# Patient Record
Sex: MALE | Race: BLACK OR AFRICAN AMERICAN | Employment: FULL TIME | URBAN - METROPOLITAN AREA
[De-identification: names, ages, dates, MRNs, and addresses within clinical notes are randomized per-mention and may not be internally consistent; named-entity substitution may affect disease eponyms.]

---

## 2021-12-16 ENCOUNTER — OFFICE VISIT (OUTPATIENT)
Dept: INTERNAL MEDICINE CLINIC | Age: 46
End: 2021-12-16
Payer: COMMERCIAL

## 2021-12-16 VITALS
BODY MASS INDEX: 27.85 KG/M2 | WEIGHT: 217 LBS | HEART RATE: 86 BPM | SYSTOLIC BLOOD PRESSURE: 148 MMHG | TEMPERATURE: 98.1 F | RESPIRATION RATE: 16 BRPM | HEIGHT: 74 IN | OXYGEN SATURATION: 97 % | DIASTOLIC BLOOD PRESSURE: 80 MMHG

## 2021-12-16 DIAGNOSIS — I10 ESSENTIAL HYPERTENSION: Primary | ICD-10-CM

## 2021-12-16 DIAGNOSIS — Z13.1 SCREENING FOR DIABETES MELLITUS: ICD-10-CM

## 2021-12-16 DIAGNOSIS — G60.9 IDIOPATHIC PERIPHERAL NEUROPATHY: ICD-10-CM

## 2021-12-16 DIAGNOSIS — Z00.00 HEALTHCARE MAINTENANCE: ICD-10-CM

## 2021-12-16 DIAGNOSIS — Z78.9 ALCOHOL USE: ICD-10-CM

## 2021-12-16 DIAGNOSIS — Z72.0 TOBACCO ABUSE: ICD-10-CM

## 2021-12-16 DIAGNOSIS — F41.1 GAD (GENERALIZED ANXIETY DISORDER): ICD-10-CM

## 2021-12-16 DIAGNOSIS — F32.A DEPRESSION, UNSPECIFIED DEPRESSION TYPE: ICD-10-CM

## 2021-12-16 DIAGNOSIS — K40.90 INGUINAL HERNIA, RIGHT: ICD-10-CM

## 2021-12-16 LAB
ANION GAP SERPL CALC-SCNC: 5 MMOL/L (ref 5–15)
BUN SERPL-MCNC: 20 MG/DL (ref 6–20)
BUN/CREAT SERPL: 21 (ref 12–20)
CALCIUM SERPL-MCNC: 9.9 MG/DL (ref 8.5–10.1)
CHLORIDE SERPL-SCNC: 106 MMOL/L (ref 97–108)
CHOLEST SERPL-MCNC: 352 MG/DL
CO2 SERPL-SCNC: 27 MMOL/L (ref 21–32)
CREAT SERPL-MCNC: 0.94 MG/DL (ref 0.7–1.3)
EST. AVERAGE GLUCOSE BLD GHB EST-MCNC: 117 MG/DL
GLUCOSE SERPL-MCNC: 121 MG/DL (ref 65–100)
HBA1C MFR BLD: 5.7 % (ref 4–5.6)
HDLC SERPL-MCNC: 48 MG/DL
HDLC SERPL: 7.3 {RATIO} (ref 0–5)
HIV 1+2 AB+HIV1 P24 AG SERPL QL IA: NONREACTIVE
HIV12 RESULT COMMENT, HHIVC: NORMAL
LDLC SERPL CALC-MCNC: 238.6 MG/DL (ref 0–100)
POTASSIUM SERPL-SCNC: 4.2 MMOL/L (ref 3.5–5.1)
SODIUM SERPL-SCNC: 138 MMOL/L (ref 136–145)
TRIGL SERPL-MCNC: 327 MG/DL (ref ?–150)
VLDLC SERPL CALC-MCNC: 65.4 MG/DL

## 2021-12-16 PROCEDURE — 90472 IMMUNIZATION ADMIN EACH ADD: CPT | Performed by: INTERNAL MEDICINE

## 2021-12-16 PROCEDURE — 90471 IMMUNIZATION ADMIN: CPT | Performed by: INTERNAL MEDICINE

## 2021-12-16 PROCEDURE — 90686 IIV4 VACC NO PRSV 0.5 ML IM: CPT | Performed by: INTERNAL MEDICINE

## 2021-12-16 PROCEDURE — 90715 TDAP VACCINE 7 YRS/> IM: CPT | Performed by: INTERNAL MEDICINE

## 2021-12-16 PROCEDURE — 99204 OFFICE O/P NEW MOD 45 MIN: CPT | Performed by: INTERNAL MEDICINE

## 2021-12-16 RX ORDER — HYDROCHLOROTHIAZIDE 25 MG/1
25 TABLET ORAL DAILY
Qty: 30 TABLET | Refills: 2 | Status: SHIPPED | OUTPATIENT
Start: 2021-12-16 | End: 2022-06-20 | Stop reason: SDUPTHER

## 2021-12-16 NOTE — PATIENT INSTRUCTIONS
DASH Diet: Care Instructions  Your Care Instructions     The DASH diet is an eating plan that can help lower your blood pressure. DASH stands for Dietary Approaches to Stop Hypertension. Hypertension is high blood pressure. The DASH diet focuses on eating foods that are high in calcium, potassium, and magnesium. These nutrients can lower blood pressure. The foods that are highest in these nutrients are fruits, vegetables, low-fat dairy products, nuts, seeds, and legumes. But taking calcium, potassium, and magnesium supplements instead of eating foods that are high in those nutrients does not have the same effect. The DASH diet also includes whole grains, fish, and poultry. The DASH diet is one of several lifestyle changes your doctor may recommend to lower your high blood pressure. Your doctor may also want you to decrease the amount of sodium in your diet. Lowering sodium while following the DASH diet can lower blood pressure even further than just the DASH diet alone. Follow-up care is a key part of your treatment and safety. Be sure to make and go to all appointments, and call your doctor if you are having problems. It's also a good idea to know your test results and keep a list of the medicines you take. How can you care for yourself at home? Following the DASH diet  · Eat 4 to 5 servings of fruit each day. A serving is 1 medium-sized piece of fruit, ½ cup chopped or canned fruit, 1/4 cup dried fruit, or 4 ounces (½ cup) of fruit juice. Choose fruit more often than fruit juice. · Eat 4 to 5 servings of vegetables each day. A serving is 1 cup of lettuce or raw leafy vegetables, ½ cup of chopped or cooked vegetables, or 4 ounces (½ cup) of vegetable juice. Choose vegetables more often than vegetable juice. · Get 2 to 3 servings of low-fat and fat-free dairy each day. A serving is 8 ounces of milk, 1 cup of yogurt, or 1 ½ ounces of cheese. · Eat 6 to 8 servings of grains each day.  A serving is 1 slice of bread, 1 ounce of dry cereal, or ½ cup of cooked rice, pasta, or cooked cereal. Try to choose whole-grain products as much as possible. · Limit lean meat, poultry, and fish to 2 servings each day. A serving is 3 ounces, about the size of a deck of cards. · Eat 4 to 5 servings of nuts, seeds, and legumes (cooked dried beans, lentils, and split peas) each week. A serving is 1/3 cup of nuts, 2 tablespoons of seeds, or ½ cup of cooked beans or peas. · Limit fats and oils to 2 to 3 servings each day. A serving is 1 teaspoon of vegetable oil or 2 tablespoons of salad dressing. · Limit sweets and added sugars to 5 servings or less a week. A serving is 1 tablespoon jelly or jam, ½ cup sorbet, or 1 cup of lemonade. · Eat less than 2,300 milligrams (mg) of sodium a day. If you limit your sodium to 1,500 mg a day, you can lower your blood pressure even more. · Be aware that all of these are the suggested number of servings for people who eat 1,800 to 2,000 calories a day. Your recommended number of servings may be different if you need more or fewer calories. Tips for success  · Start small. Do not try to make dramatic changes to your diet all at once. You might feel that you are missing out on your favorite foods and then be more likely to not follow the plan. Make small changes, and stick with them. Once those changes become habit, add a few more changes. · Try some of the following:  ? Make it a goal to eat a fruit or vegetable at every meal and at snacks. This will make it easy to get the recommended amount of fruits and vegetables each day. ? Try yogurt topped with fruit and nuts for a snack or healthy dessert. ? Add lettuce, tomato, cucumber, and onion to sandwiches. ? Combine a ready-made pizza crust with low-fat mozzarella cheese and lots of vegetable toppings. Try using tomatoes, squash, spinach, broccoli, carrots, cauliflower, and onions. ?  Have a variety of cut-up vegetables with a low-fat dip as an appetizer instead of chips and dip. ? Sprinkle sunflower seeds or chopped almonds over salads. Or try adding chopped walnuts or almonds to cooked vegetables. ? Try some vegetarian meals using beans and peas. Add garbanzo or kidney beans to salads. Make burritos and tacos with mashed julio beans or black beans. Where can you learn more? Go to http://www.sheets.com/  Enter H967 in the search box to learn more about \"DASH Diet: Care Instructions. \"  Current as of: April 29, 2021               Content Version: 13.0  © 2776-5329 [employer]. Care instructions adapted under license by [employer] (which disclaims liability or warranty for this information). If you have questions about a medical condition or this instruction, always ask your healthcare professional. [address] any warranty or liability for your use of this information.

## 2021-12-16 NOTE — PROGRESS NOTES
Chief Complaint   Patient presents with   [facility]    Foot Problem     pain 6/10; chronic; pt describes as \" bone pain;     1. Have you been to the ER, urgent care clinic since your last visit? Hospitalized since your last visit? No    2. Have you seen or consulted any other health care providers outside of the [address] [place] [address] since your last visit? Include any pap smears or colon screening. Karissa Ventura is a 42 y.o. male  who presents for routine immunizations. He denies any symptoms , reactions or allergies that would exclude them from being immunized today. Risks and adverse reactions were discussed and the VIS was given to them. All questions were addressed. He was observed for 5 min post injection. There were no reactions observed.     Deepak Castañeda

## 2021-12-16 NOTE — PROGRESS NOTES
Office Visit Note:    Assessment/Plan:  1. Essential hypertension    2. Idiopathic peripheral neuropathy    3. Inguinal hernia, right    4. Screening for diabetes mellitus    5. Tobacco abuse    6. Depression, unspecified depression type    7. JOSE G (generalized anxiety disorder)    8. Alcohol use    9. Healthcare maintenance      1. Hypertension-he has newly diagnosed hypertension, blood pressure significantly elevated at 148/80. Will need to be started on antihypertensives, will start with hydrochlorothiazide at 25 mg. Advised to go on low-salt diet, handout for DASH diet given. Since he is hypertensive I also advised him to stop smoking because that would increase his cardiac risk. We will get a lipid panel to calculate his cardiac risk and if elevated may need to be on a statin. We will follow-up with him in 3 to 4 weeks. Advised to get a blood pressure monitor and bring a blood pressure log with ambulatory blood pressure readings. 2. Peripheral neuropathy-he has evidence of peripheral neuropathy with there are no clear etiology. Will screen for diabetes, he also drinks alcohol daily, advised to cut down on alcohol. I will refer to neurology for further evaluation  3. Right inguinal hernia-we will refer to general surgery  4. Screening for diabetes-he does complain of some polyuria and polydipsia, also had an abscess in his foot. Will do an A1c to screen for diabetes  5. Tobacco use-he did smoke daily, advised to stop smoking. He will tell me when he is ready to quit smoking he may benefit from being on Wellbutrin  6. Depression and anxiety-his JOSE G-7 score was at 15 and his PHQ-9 score was at 12. Discussed starting him on medications which he was not too keen on. Not suicidal at this time, will discuss again next visit  7.  Alcohol use-he tells me that he drinks 2 beers every day, drinking 14 drinks a week, I told him that he is at a high risk for alcohol dependence and asked him to cut down his alcohol use    Health Maintenance:  Immunizations:  Tetanus: given 12/16/21  Influenza:given 12/16/21  Covid:  2 doses of pfizer    Screening:  Colon cancer: start at age of 39  Hepatitis C: Ordered today  HIV: Ordered today  Diabetes: A1c ordered today      Orders Placed This Encounter    INFLUENZA VIRUS VACCINE QUADRIVALENT, PRESERVATIVE FREE SYRINGE (50010)    TETANUS, DIPHTHERIA TOXOIDS AND ACELLULAR PERTUSSIS VACCINE (TDAP), IN INDIVIDS. >=7, IM    METABOLIC PANEL, BASIC     Standing Status:   Future     Standing Expiration Date:   12/16/2022    HCV AB W/RFLX TO WOJCIECH     Standing Status:   Future     Standing Expiration Date:   12/16/2022    HEMOGLOBIN A1C WITH EAG     Standing Status:   Future     Standing Expiration Date:   12/16/2022    HIV 1/2 AG/AB, 4TH GENERATION,W RFLX CONFIRM     Standing Status:   Future     Standing Expiration Date:   12/16/2022    LIPID PANEL     Standing Status:   Future     Standing Expiration Date:   12/16/2022    REFERRAL TO NEUROLOGY     Referral Priority:   Routine     Referral Type:   Consultation     Referral Reason:   Specialty Services Required     Referred to Provider:   Daryle Mires, MD     Requested Specialty:   Neurology     Number of Visits Requested:   1    REFERRAL TO GENERAL SURGERY     Referral Priority:   Routine     Referral Type:   Consultation     Referral Reason:   Specialty Services Required     Referred to Provider:   Priscilla Mcdowell MD     Requested Specialty:   General Surgery     Number of Visits Requested:   1    hydroCHLOROthiazide (HYDRODIURIL) 25 mg tablet     Sig: Take 1 Tablet by mouth daily.      Dispense:  30 Tablet     Refill:  2     Social Determinants of Health     Tobacco Use: High Risk    Smoking Tobacco Use: Current Every Day Smoker    Smokeless Tobacco Use: Never Used   Alcohol Use:     Frequency of Alcohol Consumption: Not on file    Average Number of Drinks: Not on file    Frequency of Binge Drinking: Not on file Financial Resource Strain:     Difficulty of Paying Living Expenses: Not on file   Food Insecurity:     Worried About Running Out of Food in the Last Year: Not on file    Leon of Food in the Last Year: Not on file   Transportation Needs:     Lack of Transportation (Medical): Not on file    Lack of Transportation (Non-Medical): Not on file   Physical Activity:     Days of Exercise per Week: Not on file    Minutes of Exercise per Session: Not on file   Stress:     Feeling of Stress : Not on file   Social Connections:     Frequency of Communication with Friends and Family: Not on file    Frequency of Social Gatherings with Friends and Family: Not on file    Attends Islam Services: Not on file    Active Member of [employer] or Organizations: Not on file    Attends Club or Organization Meetings: Not on file    Marital Status: Not on file   Intimate Partner Violence:     Fear of Current or Ex-Partner: Not on file    Emotionally Abused: Not on file    Physically Abused: Not on file    Sexually Abused: Not on file   Depression:     PHQ-2 Score: Not on file   Housing Stability:     Unable to Pay for Housing in the Last Year: Not on file    Number of [place] in the Last Year: Not on file    Unstable Housing in the Last Year: Not on file       Follow-up and Dispositions    · Return in about 1 month (around 1/16/2022). I have reviewed with the patient details of the assessment and plan and all questions were answered. Relevant patient education was performed. The most recent lab findings were reviewed with the patient. An After Visit Summary was printed and given to the patient. Reason for Visit: Establish Care and Foot Problem (pain 6/10; chronic; pt describes as \" bone pain;)      Subjective:  43-year-old male with no significant past medical history comes to establish with me as a primary care physician. He does complain of some numbness in his bilateral feet more in his left foot. He states that it started insidiously and has progressed over the last couple of years. He apparently developed an abscess in his left cough which resolved spontaneously. He did not see a doctor for it. He has also noticed a right inguinal hernia when he coughs. Denies any pain at this time. He denies any other complaints to me now. He denies fevers, chills, chest pain, shortness of breath, abdominal pain, nausea, vomiting, diarrhea constipation. Review of Systems  A complete 11 system ROS was preformed (constitutional, eyes, ENT, cardiovascular, respiratory, gastrointestinal, genitourinary, musculoskeletal, skin, neurological, psychiatric) and was negative aside from the pertinent positives and negatives noted in the HPI. History reviewed. No pertinent past medical history. History reviewed. No pertinent surgical history. Social History     Socioeconomic History    Marital status: UNKNOWN   Tobacco Use    Smoking status: Current Every Day Smoker    Smokeless tobacco: Never Used   Substance and Sexual Activity    Alcohol use: Yes     Alcohol/week: 15.0 standard drinks     Types: 15 Cans of beer per week     Comment: pt reports about 2 beer per day    Drug use: Not Currently    Sexual activity: Yes     Partners: Female     Family History   Problem Relation Age of Onset    Hypertension Mother     Heart Disease Father     Diabetes Brother        No Known Allergies    Objective:  Visit Vitals  BP (!) 148/80 (BP 1 Location: Left upper arm, BP Patient Position: Semi fowlers, BP Cuff Size: Adult)   Pulse 86   Temp 98.1 °F (36.7 °C) (Oral)   Resp 16   Ht 6' 2\" (1.88 m)   Wt 217 lb (98.4 kg)   SpO2 97%   BMI 27.86 kg/m²     Physical Exam:   AA&O x3. not in any distress. HEENT: ENT negative. Lungs: clear  Heart: S1 S2 + flow murmur present  Abdomen: Soft, No tenderness  Neuro: No focal deficits.   Skin: No fluctuance or erythema noted in the lower extremities  Extremities: no pedal edema, R ing hernia  Psych: Normal affect and mood. No results found for this or any previous visit. Aria Stone MD, [address].   [address] [address], [place], [place].

## 2021-12-16 NOTE — LETTER
12/16/2021 9:33 AM    Mr. Regan Palacios  [address] [facility] [address]      Re: Regan Palacios  1975     Mr Regan Palacios was seen today, December 15, 2021  in this clinic by Dr Taisha Arreguin to establish care.           Sincerely,      Taisha Arreguin MD

## 2021-12-17 LAB
HCV AB S/CO SERPL IA: <0.1 S/CO RATIO (ref 0–0.9)
HCV AB SERPL QL IA: NORMAL

## 2021-12-29 RX ORDER — ATORVASTATIN CALCIUM 40 MG/1
40 TABLET, FILM COATED ORAL DAILY
Qty: 30 TABLET | Refills: 2 | Status: SHIPPED | OUTPATIENT
Start: 2021-12-29 | End: 2022-01-18 | Stop reason: SDUPTHER

## 2021-12-29 NOTE — PROGRESS NOTES
High LDL noted, just prediabetic, Will order high intensity atorvostatin. I called the patient and discussed the results with him.  Will fu in 6-8 weeks for repeat lipid panel

## 2022-01-18 ENCOUNTER — OFFICE VISIT (OUTPATIENT)
Dept: INTERNAL MEDICINE CLINIC | Age: 47
End: 2022-01-18
Payer: COMMERCIAL

## 2022-01-18 VITALS
OXYGEN SATURATION: 100 % | RESPIRATION RATE: 16 BRPM | HEIGHT: 74 IN | BODY MASS INDEX: 27.85 KG/M2 | HEART RATE: 76 BPM | TEMPERATURE: 98.1 F | SYSTOLIC BLOOD PRESSURE: 146 MMHG | DIASTOLIC BLOOD PRESSURE: 91 MMHG | WEIGHT: 217 LBS

## 2022-01-18 DIAGNOSIS — Z72.0 TOBACCO ABUSE: ICD-10-CM

## 2022-01-18 DIAGNOSIS — E78.2 MIXED HYPERLIPIDEMIA: ICD-10-CM

## 2022-01-18 DIAGNOSIS — F32.A DEPRESSION, UNSPECIFIED DEPRESSION TYPE: ICD-10-CM

## 2022-01-18 DIAGNOSIS — G60.9 IDIOPATHIC PERIPHERAL NEUROPATHY: ICD-10-CM

## 2022-01-18 DIAGNOSIS — Z00.00 HEALTHCARE MAINTENANCE: ICD-10-CM

## 2022-01-18 DIAGNOSIS — F41.1 GAD (GENERALIZED ANXIETY DISORDER): ICD-10-CM

## 2022-01-18 DIAGNOSIS — I10 ESSENTIAL HYPERTENSION: Primary | ICD-10-CM

## 2022-01-18 DIAGNOSIS — R73.03 PREDIABETES: ICD-10-CM

## 2022-01-18 PROCEDURE — 99214 OFFICE O/P EST MOD 30 MIN: CPT | Performed by: INTERNAL MEDICINE

## 2022-01-18 RX ORDER — AMLODIPINE BESYLATE 10 MG/1
10 TABLET ORAL DAILY
Qty: 30 TABLET | Refills: 2 | Status: SHIPPED | OUTPATIENT
Start: 2022-01-18 | End: 2022-03-15

## 2022-01-18 RX ORDER — ATORVASTATIN CALCIUM 40 MG/1
80 TABLET, FILM COATED ORAL DAILY
Qty: 30 TABLET | Refills: 2 | Status: SHIPPED | OUTPATIENT
Start: 2022-01-18 | End: 2022-01-20

## 2022-01-18 NOTE — PROGRESS NOTES
Office Visit Note:    Assessment/Plan:  1. Essential hypertension    2. Prediabetes    3. Mixed hyperlipidemia    4. JOSE G (generalized anxiety disorder)    5. Depression, unspecified depression type    6. Tobacco abuse    7. Idiopathic peripheral neuropathy    8. Healthcare maintenance      1. Hypertension-his blood pressure is still elevated at 146/91. He is already on hydrochlorothiazide 25 mg. I will add amlodipine 10 mg. I will also advised him to check his blood pressure at home and bring a log, this could also be whitecoat hypertension. Also advised to stay on the diet. 2. Hyperlipidemia-his lipid panel showed LDL of 238, triglyceride of 327 and a total cholesterol of 352. I had called in Lipitor 40 mg which she is tolerating well, will increase it to 80 mg of Lipitor, will plan to check lipid panel in his next visit in 2 months. 3. Peripheral neuropathy-referred to neurology last visit, improving  4. Right inguinal hernia-seeing general surgery next week  5. Pre diabetes-advised weight loss  6. Tobacco use-he did smoke daily, advised to stop smoking. He will tell me when he is ready to quit smoking he may benefit from being on Wellbutrin  7. Depression and anxiety-improving  8. Alcohol use-he tells me that he drinks 2 beers every day, drinking 14 drinks a week, I told him that he is at a high risk for alcohol dependence and asked him to cut down his alcohol use    Health Maintenance:  Immunizations:  Tetanus: given 12/16/21  Influenza:given 12/16/21  Covid:  2 doses of pfizer    Screening:  Colon cancer: start at age of 39  Hepatitis C: Ordered today  HIV: Ordered today  Diabetes: A1c ordered today      Orders Placed This Encounter    amLODIPine (NORVASC) 10 mg tablet     Sig: Take 1 Tablet by mouth daily. Dispense:  30 Tablet     Refill:  2    atorvastatin (LIPITOR) 40 mg tablet     Sig: Take 2 Tablets by mouth daily.      Dispense:  30 Tablet     Refill:  2     Social Determinants of Health Tobacco Use: High Risk    Smoking Tobacco Use: Current Every Day Smoker    Smokeless Tobacco Use: Never Used   Alcohol Use:     Frequency of Alcohol Consumption: Not on file    Average Number of Drinks: Not on file    Frequency of Binge Drinking: Not on file   Financial Resource Strain:     Difficulty of Paying Living Expenses: Not on file   Food Insecurity:     Worried About Running Out of Food in the Last Year: Not on file    Leon of Food in the Last Year: Not on file   Transportation Needs:     Lack of Transportation (Medical): Not on file    Lack of Transportation (Non-Medical): Not on file   Physical Activity:     Days of Exercise per Week: Not on file    Minutes of Exercise per Session: Not on file   Stress:     Feeling of Stress : Not on file   Social Connections:     Frequency of Communication with Friends and Family: Not on file    Frequency of Social Gatherings with Friends and Family: Not on file    Attends Yazidism Services: Not on file    Active Member of [address] [place] [address] or Organizations: Not on file    Attends Club or Organization Meetings: Not on file    Marital Status: Not on file   Intimate Partner Violence:     Fear of Current or Ex-Partner: Not on file    Emotionally Abused: Not on file    Physically Abused: Not on file    Sexually Abused: Not on file   Depression:     PHQ-2 Score: Not on file   Housing Stability:     Unable to Pay for Housing in the Last Year: Not on file    Number of [place] in the Last Year: Not on file    Unstable Housing in the Last Year: Not on file       Follow-up and Dispositions    · Return in about 2 months (around 3/18/2022). I have reviewed with the patient details of the assessment and plan and all questions were answered. Relevant patient education was performed. The most recent lab findings were reviewed with the patient. An After Visit Summary was printed and given to the patient.     Reason for Visit: Follow-up      Subjective:  44-year-old male who was recently diagnosed with hypertension comes for follow-up. He states that he does not check his blood pressure regularly at home. He tries to follow his diet. He also had some episodes of peripheral neuropathy which is improving. No other complaints. Review of Systems  A complete 11 system ROS was preformed (constitutional, eyes, ENT, cardiovascular, respiratory, gastrointestinal, genitourinary, musculoskeletal, skin, neurological, psychiatric) and was negative aside from the pertinent positives and negatives noted in the HPI. History reviewed. No pertinent past medical history. History reviewed. No pertinent surgical history. Social History     Socioeconomic History    Marital status:    Tobacco Use    Smoking status: Current Every Day Smoker    Smokeless tobacco: Never Used   Substance and Sexual Activity    Alcohol use: Yes     Alcohol/week: 15.0 standard drinks     Types: 15 Cans of beer per week     Comment: pt reports about 2 beer per day    Drug use: Not Currently    Sexual activity: Yes     Partners: Female     Family History   Problem Relation Age of Onset    Hypertension Mother     Heart Disease Father     Diabetes Brother        No Known Allergies    Objective:  Visit Vitals  BP (!) 146/91 (BP 1 Location: Right arm, BP Patient Position: Sitting, BP Cuff Size: Large adult)   Pulse 76   Temp 98.1 °F (36.7 °C) (Oral)   Resp 16   Ht 6' 2\" (1.88 m)   Wt 217 lb (98.4 kg)   SpO2 100%   BMI 27.86 kg/m²     Physical Exam:   AA&O x3. not in any distress. HEENT: ENT negative. Lungs: clear  Heart: S1 S2 + flow murmur present  Psych: Normal affect and mood.   Results for orders placed or performed in visit on 12/16/21   LIPID PANEL   Result Value Ref Range    Cholesterol, total 352 (H) <200 MG/DL    Triglyceride 327 (H) <150 MG/DL    HDL Cholesterol 48 MG/DL    LDL, calculated 238.6 (H) 0 - 100 MG/DL    VLDL, calculated 65.4 MG/DL CHOL/HDL Ratio 7.3 (H) 0.0 - 5.0     METABOLIC PANEL, BASIC   Result Value Ref Range    Sodium 138 136 - 145 mmol/L    Potassium 4.2 3.5 - 5.1 mmol/L    Chloride 106 97 - 108 mmol/L    CO2 27 21 - 32 mmol/L    Anion gap 5 5 - 15 mmol/L    Glucose 121 (H) 65 - 100 mg/dL    BUN 20 6 - 20 MG/DL    Creatinine 0.94 0.70 - 1.30 MG/DL    BUN/Creatinine ratio 21 (H) 12 - 20      GFR est AA >60 >60 ml/min/1.73m2    GFR est non-AA >60 >60 ml/min/1.73m2    Calcium 9.9 8.5 - 10.1 MG/DL   HCV AB W/RFLX TO WOJCIECH   Result Value Ref Range    HCV Ab <0.1 0.0 - 0.9 s/co ratio   HEMOGLOBIN A1C WITH EAG   Result Value Ref Range    Hemoglobin A1c 5.7 (H) 4.0 - 5.6 %    Est. average glucose 117 mg/dL   HIV 1/2 AG/AB, 4TH GENERATION,W RFLX CONFIRM   Result Value Ref Range    HIV 1/2 Interpretation NONREACTIVE NONREACTIVE      HIV 1/2 result comment SEE NOTE     HCV INTERPRETATION   Result Value Ref Range    HCV Interpretation Comment           Catherine Elizabeth MD, FACP, [facility].   [address] [facility].

## 2022-01-18 NOTE — PROGRESS NOTES
Identified pt with two pt identifiers(name and ). Reviewed record in preparation for visit and have obtained necessary documentation. Chief Complaint   Patient presents with    Follow-up        Health Maintenance Due   Topic    Pneumococcal 0-64 years (1 of 2 - PPSV23)    COVID-19 Vaccine (3 - Booster for Major Peter series)        Visit Vitals  BP (!) 146/91 (BP 1 Location: Right arm, BP Patient Position: Sitting, BP Cuff Size: Large adult)   Pulse 76   Temp 98.1 °F (36.7 °C) (Oral)   Resp 16   Ht 6' 2\" (1.88 m)   Wt 217 lb (98.4 kg)   SpO2 100%   BMI 27.86 kg/m²     Pain Scale: 0 - No pain/10    Coordination of Care Questionnaire:  :   1. Have you been to the ER, urgent care clinic since your last visit? Hospitalized since your last visit? No    2. Have you seen or consulted any other health care providers outside of the [address] [place] [address] since your last visit? Include any pap smears or colon screening.  No

## 2022-01-18 NOTE — LETTER
1/18/2022 9:33 AM    Mr. Bernabe Irizarry  [address]. [place] [address]      Re: Bernabe Irizarry  1975     Mr Bernabe Irizarry was seen today, January 18th, 2022  in this clinic by Dr Leena Ford to establish care.           Sincerely,      Leena Ford MD

## 2022-01-18 NOTE — PATIENT INSTRUCTIONS
High Cholesterol: Care Instructions  Overview     Cholesterol is a type of fat in your blood. It is needed for many body functions, such as making new cells. Cholesterol is made by your body. It also comes from food you eat. High cholesterol means that you have too much of the fat in your blood. This raises your risk of a heart attack and stroke. LDL and HDL are part of your total cholesterol. LDL is the \"bad\" cholesterol. High LDL can raise your risk for coronary artery disease, heart attack, and stroke. HDL is the \"good\" cholesterol. It helps clear bad cholesterol from the body. High HDL is linked with a lower risk of coronary artery disease, heart attack, and stroke. Your cholesterol levels help your doctor find out your risk for having a heart attack or stroke. You and your doctor can talk about whether you need to lower your risk and what treatment is best for you. Treatment options include a heart-healthy lifestyle and medicine. Both options can help lower your cholesterol and your risk. The way you choose to lower your risk will depend on how high your risk is for heart attack and stroke. It will also depend on how you feel about taking medicines. Follow-up care is a key part of your treatment and safety. Be sure to make and go to all appointments, and call your doctor if you are having problems. It's also a good idea to know your test results and keep a list of the medicines you take. How can you care for yourself at home? · Eat heart-healthy foods. ? Eat fruits, vegetables, whole grains, beans, and other high-fiber foods. ? Eat lean proteins, such as seafood, lean meats, beans, nuts, and soy products. ? Eat healthy fats, such as canola and olive oil. ? Choose foods that are low in saturated fat. ? Limit sodium and alcohol. ? Limit drinks and foods with added sugar. · Be physically active. Try to do moderate activity at least 2½ hours a week.  Or try to do vigorous activity at least 1¼ hours a week. You may want to walk or try other activities, such as running, swimming, cycling, or playing tennis or team sports. · Stay at a healthy weight or lose weight by making the changes in eating and physical activity listed above. Losing just a small amount of weight, even 5 to 10 pounds, can help reduce your risk for having a heart attack or stroke. · Do not smoke. · Manage other health problems. These include diabetes and high blood pressure. If you think you may have a problem with alcohol or drug use, talk to your doctor. · If you take medicine, take it exactly as prescribed. Call your doctor if you think you are having a problem with your medicine. · Check with your doctor or pharmacist before you use any other medicines, including over-the-counter medicines. Make sure your doctor knows all of the medicines, vitamins, herbal products, and supplements you take. Taking some medicines together can cause problems. When should you call for help? Watch closely for changes in your health, and be sure to contact your doctor if:    · You need help making lifestyle changes.     · You have questions about your medicine. Where can you learn more? Go to http://www.gray.com/  Enter G338 in the search box to learn more about \"High Cholesterol: Care Instructions. \"  Current as of: April 29, 2021               Content Version: 13.0  © 2006-2021 Healthwise[employer]. Care instructions adapted under license by [employer] (which disclaims liability or warranty for this information). If you have questions about a medical condition or this instruction, always ask your healthcare professional. [place] [address] any warranty or liability for your use of this information.

## 2022-01-20 ENCOUNTER — TELEPHONE (OUTPATIENT)
Dept: INTERNAL MEDICINE CLINIC | Age: 47
End: 2022-01-20

## 2022-01-20 RX ORDER — ATORVASTATIN CALCIUM 80 MG/1
80 TABLET, FILM COATED ORAL DAILY
Qty: 30 TABLET | Refills: 2 | Status: SHIPPED | OUTPATIENT
Start: 2022-01-20 | End: 2022-06-20 | Stop reason: SDUPTHER

## 2022-01-20 RX ORDER — ATORVASTATIN CALCIUM 40 MG/1
80 TABLET, FILM COATED ORAL DAILY
Qty: 30 TABLET | Refills: 2 | Status: CANCELLED | OUTPATIENT
Start: 2022-01-20

## 2022-01-20 NOTE — TELEPHONE ENCOUNTER
[facility] would like to clarify th directions and the quantity for the Lipitor 40 mg? Was prescribed for 2 tabs daily with a qnty of 30. Please review. Thank you. Requested Prescriptions     Pending Prescriptions Disp Refills    atorvastatin (LIPITOR) 40 mg tablet 30 Tablet 2     Sig: Take 2 Tablets by mouth daily.

## 2022-01-25 ENCOUNTER — OFFICE VISIT (OUTPATIENT)
Dept: SURGERY | Age: 47
End: 2022-01-25
Payer: COMMERCIAL

## 2022-01-25 VITALS
DIASTOLIC BLOOD PRESSURE: 60 MMHG | SYSTOLIC BLOOD PRESSURE: 100 MMHG | BODY MASS INDEX: 27.14 KG/M2 | HEIGHT: 74 IN | TEMPERATURE: 97.5 F | OXYGEN SATURATION: 96 % | RESPIRATION RATE: 16 BRPM | HEART RATE: 95 BPM | WEIGHT: 211.5 LBS

## 2022-01-25 DIAGNOSIS — K40.20 NON-RECURRENT BILATERAL INGUINAL HERNIA WITHOUT OBSTRUCTION OR GANGRENE: Primary | ICD-10-CM

## 2022-01-25 PROCEDURE — 99204 OFFICE O/P NEW MOD 45 MIN: CPT | Performed by: SURGERY

## 2022-01-25 NOTE — LETTER
1/25/2022    Patient: Sandy Rawls   YOB: 1975   Date of Visit: 1/25/2022     Natasha Oliver MD  [address] [place] [address]  Via In Basket    Dear Natasha Oliver MD,      Thank you for referring Mr. Sandy Rawls to [address] for evaluation. My notes for this consultation are attached. If you have questions, please do not hesitate to call me. I look forward to following your patient along with you.       Sincerely,    Anthony Cintron MD

## 2022-01-25 NOTE — PROGRESS NOTES
Identified pt with two pt identifiers(name and ). Reviewed record in preparation for visit and have obtained necessary documentation. All patient medications has been reviewed. Chief Complaint   Patient presents with    Possible Hernia     Seen at the request of Dr Rodriguez Letters for ing hernia        Health Maintenance Due   Topic    Pneumococcal 0-64 years (1 of 2 - PPSV23)    COVID-19 Vaccine (3 - Booster for Pfizer series)       Vitals:    22 0904   BP: 100/60   Pulse: 95   Resp: 16   Temp: 97.5 °F (36.4 °C)   TempSrc: Temporal   SpO2: 96%   Weight: 95.9 kg (211 lb 8 oz)   Height: 6' 2\" (1.88 m)   PainSc:   0 - No pain       4. Have you been to the ER, urgent care clinic since your last visit? Hospitalized since your last visit? No    5. Have you seen or consulted any other health care providers outside of the [address] [place] [address] since your last visit? Include any pap smears or colon screening. No      Patient is accompanied by wife I have received verbal consent from [facility] to discuss any/all medical information while they are present in the room.

## 2022-01-25 NOTE — PROGRESS NOTES
HISTORY OF PRESENT ILLNESS  Jennifer Sneed is a 42 y.o. male who comes in for consultation by Bridget Alejandro MD for a hernia  HPI  He has noted right groin swelling and discomfort for about 6 months. It is getting worse but goes away when he lays down. He does not recall an inciting event. He reports some indigestion and intermittent diarrhea/constipation but denies nausea, vomiting, melena, hematochezia, dysuria or hematuria. He has not had surgery on the area previously. Past Medical History:   Diagnosis Date    Anxiety     Depression     Hypercholesterolemia     Hypertension      History reviewed. No pertinent surgical history. Family History   Problem Relation Age of Onset    Hypertension Mother     Heart Disease Father     Diabetes Brother      Social History     Tobacco Use    Smoking status: Current Every Day Smoker     Packs/day: 1.00     Years: 30.00     Pack years: 30.00    Smokeless tobacco: Never Used   Substance Use Topics    Alcohol use: Yes     Alcohol/week: 15.0 standard drinks     Types: 15 Cans of beer per week     Comment: pt reports about 2 beer per day    Drug use: Not Currently     Current Outpatient Medications   Medication Sig    atorvastatin (LIPITOR) 80 mg tablet Take 1 Tablet by mouth daily.  hydroCHLOROthiazide (HYDRODIURIL) 25 mg tablet Take 1 Tablet by mouth daily.  amLODIPine (NORVASC) 10 mg tablet Take 1 Tablet by mouth daily. (Patient not taking: Reported on 1/25/2022)     No current facility-administered medications for this visit. No Known Allergies    Review of Systems   Constitutional: Negative for chills, diaphoresis, fever and weight loss. HENT: Negative for sore throat. Eyes: Negative for blurred vision and discharge. Respiratory: Positive for cough and wheezing. Negative for shortness of breath. Cardiovascular: Negative for chest pain, palpitations, orthopnea, claudication and leg swelling.    Gastrointestinal: Positive for abdominal pain, constipation, diarrhea and heartburn. Negative for melena, nausea and vomiting. Genitourinary: Negative for dysuria, flank pain, frequency and hematuria. Musculoskeletal: Negative for back pain, joint pain, myalgias and neck pain. Skin: Negative for rash. Neurological: Negative for dizziness, speech change, focal weakness, seizures, loss of consciousness, weakness and headaches. Endo/Heme/Allergies: Does not bruise/bleed easily. Psychiatric/Behavioral: Positive for depression. Negative for memory loss. The patient is nervous/anxious. Visit Vitals  /60 (BP 1 Location: Left upper arm, BP Patient Position: Sitting)   Pulse 95   Temp 97.5 °F (36.4 °C) (Temporal)   Resp 16   Ht 6' 2\" (1.88 m)   Wt 95.9 kg (211 lb 8 oz)   SpO2 96%   BMI 27.15 kg/m²       Physical Exam  Constitutional:       General: He is not in acute distress. Appearance: Normal appearance. He is well-developed. He is not diaphoretic. HENT:      Head: Normocephalic and atraumatic. Mouth/Throat:      Pharynx: No oropharyngeal exudate. Eyes:      General: No scleral icterus. Conjunctiva/sclera: Conjunctivae normal.      Pupils: Pupils are equal, round, and reactive to light. Neck:      Thyroid: No thyromegaly. Trachea: No tracheal deviation. Cardiovascular:      Rate and Rhythm: Normal rate and regular rhythm. Heart sounds: Normal heart sounds. No murmur heard. No friction rub. No gallop. Pulmonary:      Effort: Pulmonary effort is normal. No respiratory distress. Breath sounds: No stridor. Examination of the left-upper field reveals wheezing. Wheezing present. No rales. Abdominal:      General: Bowel sounds are normal. There is no distension. Palpations: Abdomen is soft. There is no mass. Tenderness: There is no abdominal tenderness. There is no right CVA tenderness, left CVA tenderness, guarding or rebound. Negative signs include Ramirez's sign and Rovsing's sign. Hernia: A hernia is present. Hernia is present in the left inguinal area (small reducible) and right inguinal area (medium reducible). There is no hernia in the umbilical area. Genitourinary:     Penis: Circumcised. Testes: Normal. Cremasteric reflex is present. Musculoskeletal:         General: No tenderness. Normal range of motion. Cervical back: Normal range of motion and neck supple. Lymphadenopathy:      Cervical: No cervical adenopathy. Skin:     General: Skin is warm and dry. Findings: No erythema or rash. Neurological:      Mental Status: He is alert and oriented to person, place, and time. Cranial Nerves: No cranial nerve deficit. Coordination: Coordination normal.   Psychiatric:         Behavior: Behavior normal.         Thought Content: Thought content normal.         Judgment: Judgment normal.         ASSESSMENT and PLAN  1. BIH. I explained about the anatomy and pathophysiology of hernias and the risk of incarceration and strangulation of the bowel. I explained about hernia repairs (open with and without mesh, and robotic assisted and laparoscopic with mesh). I explained the risks and benefits of repair including bleeding, infection, chronic pain, orchalgia, loss of testes, bowel or bladder injury, hernia recurrence, seroma, mesh infection requiring removal.  I explained it would be a six to eight week recuperation with no driving for 5 - 7 days, no lifting for six weeks. 2.  Tobacco abuse. 1 ppd. Recommended cessation  3. Essential hypertension. Controlled on rx  4. Vaccinated for Covid-19. Had [email or web address] booster 1/2022  5. Social:  . Here with wife.   Works as a             He wishes to proceed with a laparoscopic totally extraperitoneal preperitoneal bilateral inguinal hernia repair with mesh under general anesthesia as an outpatient     The patient was counseled at length about the risks of jon Covid-19 in the darya-operative and post-operative states including the recovery window of their procedure. The patient was made aware that jon Covid-19 after a surgical procedure may worsen their prognosis for recovering from the virus and lend to a higher morbidity and or mortality risk. The patient was given the options of postponing their procedure. All of the risks, benefits, and alternatives were discussed. The patient  wishes to proceed with the procedure.       Marlo West MD FACS

## 2022-02-21 NOTE — PERIOP NOTES
Pt verbally gave name,  and procedure and then asked for me to call his wife at [phone] to complete his chart. Called and lm for rc.

## 2022-02-21 NOTE — PERIOP NOTES
carlitos sent to Darrel Escamilla NP. PCP Dr. Mckenzie Mora. Best person to speak with is wife, Janice Brewer at [phone].

## 2022-02-21 NOTE — PERIOP NOTES
[facility]  Ambulatory Surgery Unit  Pre-operative Instructions    Surgery/Procedure Date  Monday, February 28, 2022            Tentative Arrival Time TBD      1. On the day of your surgery/procedure, please report to the Ambulatory Surgery Unit Registration Desk and sign in at your designated time. The Ambulatory Surgery Unit is located in [facility] on the [facility] side of the [facility] across from the [address] [place] [address]. Please have all of your health insurance cards and a photo ID. **Due to current COVID restrictions, only ONE adult may accompany you the day of the procedure. We have limited seating available. If our waiting room is at capacity, your ride may be asked to remain in their vehicle. No children are allowed in the waiting room. 2. You must have someone with you to drive you home, as you should not drive a car for 24 hours following anesthesia. Please make arrangements for a responsible adult friend or family member to stay with you for at least the first 24 hours after your surgery. 3. Do not have anything to eat or drink (including water, gum, mints, coffee, juice) after 11:59 PM, Sunday. This may not apply to medications prescribed by your physician. (Please note below the special instructions with medications to take the morning of surgery, if applicable.)    4. We recommend you do not drink any alcoholic beverages for 24 hours before and after your surgery. 5. Contact your surgeons office for instructions on the following medications: non-steroidal anti-inflammatory drugs (i.e. Advil, Aleve), vitamins, and supplements. (Some surgeons will want you to stop these medications prior to surgery and others may allow you to take them)   **If you are currently taking Plavix, Coumadin, Aspirin and/or other blood-thinning agents, contact your surgeon for instructions. ** Your surgeon will partner with the physician prescribing these medications to determine if it is safe to stop or if you need to continue taking. Please do not stop taking these medications without instructions from your surgeon. 6. In an effort to help prevent surgical site infection, we ask that you shower with an anti-bacterial soap (i.e. Dial/Safeguard, or the soap provided to you at your preadmission testing appointment) for 3 days prior to and on the morning of surgery, using a fresh towel after each shower. (Please begin this process with fresh bed linens.) Do not apply any lotions, powders, or deodorants after the shower on the day of your procedure. If applicable, please do not shave the operative site for 48 hours prior to surgery. 7. Wear comfortable clothes. Wear glasses instead of contacts. Do not bring any jewelry or money (other than copays or fees as instructed). Do not wear make-up, particularly mascara, the morning of your surgery. Do not wear nail polish, particularly if you are having foot /hand surgery. Wear your hair loose or down, no ponytails, buns, alexis pins or clips. All body piercings must be removed. 8. You should understand that if you do not follow these instructions your surgery may be cancelled. If your physical condition changes (i.e. fever, cold or flu) please contact your surgeon as soon as possible. 9. It is important that you be on time. If a situation occurs where you may be late, or if you have any questions or problems, please call [phone].    10. Your surgery time may be subject to change. You will receive a phone call the day prior to surgery to confirm your arrival time. 11. Pediatric patients: please bring a change of clothes, diapers, bottle/sippy cup, pacifier, etc.      Special Instructions: Take all medications and inhalers, as prescribed, on the morning of surgery with a sip of water. I understand a pre-operative phone call will be made to verify my surgery time.   In the event that I am not available, I give permission for a message to be left on my answering service and/or with another person?       yes    Preop instructions reviewed  Pt verbalized understanding.      ___________________      ___________________      ________________  (Signature of Patient)          (Witness)                   (Date and Time)

## 2022-02-24 ENCOUNTER — HOSPITAL ENCOUNTER (OUTPATIENT)
Dept: SURGERY | Age: 47
Setting detail: OUTPATIENT SURGERY
Discharge: HOME | End: 2022-02-24
Payer: COMMERCIAL

## 2022-02-24 VITALS
RESPIRATION RATE: 12 BRPM | TEMPERATURE: 98.4 F | DIASTOLIC BLOOD PRESSURE: 97 MMHG | SYSTOLIC BLOOD PRESSURE: 143 MMHG | OXYGEN SATURATION: 99 % | HEART RATE: 89 BPM

## 2022-02-24 LAB
ANION GAP SERPL CALC-SCNC: 9 MMOL/L (ref 5–15)
BUN SERPL-MCNC: 12 MG/DL (ref 6–20)
BUN/CREAT SERPL: 11 (ref 12–20)
CALCIUM SERPL-MCNC: 9.5 MG/DL (ref 8.5–10.1)
CHLORIDE SERPL-SCNC: 103 MMOL/L (ref 97–108)
CO2 SERPL-SCNC: 25 MMOL/L (ref 21–32)
CREAT SERPL-MCNC: 1.06 MG/DL (ref 0.7–1.3)
GLUCOSE SERPL-MCNC: 110 MG/DL (ref 65–100)
POTASSIUM SERPL-SCNC: 3.3 MMOL/L (ref 3.5–5.1)
SARS-COV-2, XPLCVT: NOT DETECTED
SODIUM SERPL-SCNC: 137 MMOL/L (ref 136–145)
SOURCE, COVRS: NORMAL

## 2022-02-24 PROCEDURE — 36415 COLL VENOUS BLD VENIPUNCTURE: CPT

## 2022-02-24 PROCEDURE — 80048 BASIC METABOLIC PNL TOTAL CA: CPT

## 2022-02-24 PROCEDURE — U0005 INFEC AGEN DETEC AMPLI PROBE: HCPCS

## 2022-02-24 RX ORDER — POTASSIUM CHLORIDE 20 MEQ/1
20 TABLET, EXTENDED RELEASE ORAL 2 TIMES DAILY
Qty: 10 TABLET | Refills: 0 | Status: SHIPPED | OUTPATIENT
Start: 2022-02-24 | End: 2022-03-01

## 2022-02-24 NOTE — ADVANCED PRACTICE NURSE
K+ 3.3 in PAT. On diuretic. Surgery scheduled for 2/28/22. Rx for Klor Con BID escribed to pt's pharmacy for pt to start today. Pt to FU w/ PCP after surgery for additional recommendations regarding potassium supplementation.

## 2022-02-24 NOTE — PERIOP NOTES
[address] February 24, 2022       RE: Santosh Moffett      To Whom It May Concern,    This is to certify that Santosh Moffett may may return to work on 02/24/2022. He was seen for preadmission testing for surgery. Please feel free to contact my office if you have any questions or concerns. Thank you for your assistance in this matter.       Sincerely,  Zofia Barnes RN

## 2022-02-24 NOTE — PERIOP NOTES
Called Akbar Montgomery NP regarding potassium level. She called in prescription to pharmacy. I called wife, after two identifiers, I notified that a prescription was called in and instructions. Wife verbalized understanding.

## 2022-02-25 ENCOUNTER — ANESTHESIA EVENT (OUTPATIENT)
Dept: SURGERY | Age: 47
End: 2022-02-25
Payer: COMMERCIAL

## 2022-02-25 RX ORDER — ACETAMINOPHEN 500 MG
1000 TABLET ORAL ONCE
Status: ACTIVE | OUTPATIENT
Start: 2022-02-28 | End: 2022-02-28

## 2022-02-28 ENCOUNTER — ANESTHESIA (OUTPATIENT)
Dept: SURGERY | Age: 47
End: 2022-02-28
Payer: COMMERCIAL

## 2022-02-28 ENCOUNTER — HOSPITAL ENCOUNTER (OUTPATIENT)
Age: 47
Setting detail: OUTPATIENT SURGERY
Discharge: HOME | End: 2022-02-28
Attending: SURGERY | Admitting: SURGERY
Payer: COMMERCIAL

## 2022-02-28 VITALS
SYSTOLIC BLOOD PRESSURE: 124 MMHG | DIASTOLIC BLOOD PRESSURE: 82 MMHG | RESPIRATION RATE: 15 BRPM | HEART RATE: 84 BPM | WEIGHT: 206 LBS | OXYGEN SATURATION: 97 % | BODY MASS INDEX: 26.44 KG/M2 | HEIGHT: 74 IN | TEMPERATURE: 98.3 F

## 2022-02-28 DIAGNOSIS — K40.20 NON-RECURRENT BILATERAL INGUINAL HERNIA WITHOUT OBSTRUCTION OR GANGRENE: Primary | ICD-10-CM

## 2022-02-28 PROCEDURE — 74011250636 HC RX REV CODE- 250/636: Performed by: NURSE ANESTHETIST, CERTIFIED REGISTERED

## 2022-02-28 PROCEDURE — 77030021352 HC CBL LD SYS DISP COVD -B: Performed by: SURGERY

## 2022-02-28 PROCEDURE — 76210000046 HC AMBSU PH II REC FIRST 0.5 HR: Performed by: SURGERY

## 2022-02-28 PROCEDURE — 74011250636 HC RX REV CODE- 250/636: Performed by: ANESTHESIOLOGY

## 2022-02-28 PROCEDURE — C1727 CATH, BAL TIS DIS, NON-VAS: HCPCS | Performed by: SURGERY

## 2022-02-28 PROCEDURE — 77030031139 HC SUT VCRL2 J&J -A: Performed by: SURGERY

## 2022-02-28 PROCEDURE — 76060000062 HC AMB SURG ANES 1 TO 1.5 HR: Performed by: SURGERY

## 2022-02-28 PROCEDURE — 77030026438 HC STYL ET INTUB CARD -A: Performed by: ANESTHESIOLOGY

## 2022-02-28 PROCEDURE — 74011000250 HC RX REV CODE- 250: Performed by: NURSE ANESTHETIST, CERTIFIED REGISTERED

## 2022-02-28 PROCEDURE — 77030013079 HC BLNKT BAIR HGGR 3M -A: Performed by: ANESTHESIOLOGY

## 2022-02-28 PROCEDURE — 74011000250 HC RX REV CODE- 250: Performed by: SURGERY

## 2022-02-28 PROCEDURE — C1781 MESH (IMPLANTABLE): HCPCS | Performed by: SURGERY

## 2022-02-28 PROCEDURE — 77030019908 HC STETH ESOPH SIMS -A: Performed by: ANESTHESIOLOGY

## 2022-02-28 PROCEDURE — 76030000001 HC AMB SURG OR TIME 1 TO 1.5: Performed by: SURGERY

## 2022-02-28 PROCEDURE — 2709999900 HC NON-CHARGEABLE SUPPLY: Performed by: SURGERY

## 2022-02-28 PROCEDURE — 77030008684 HC TU ET CUF COVD -B: Performed by: ANESTHESIOLOGY

## 2022-02-28 PROCEDURE — 74011250637 HC RX REV CODE- 250/637

## 2022-02-28 PROCEDURE — 77030008606 HC TRCR ENDOSC KII AMR -B: Performed by: SURGERY

## 2022-02-28 PROCEDURE — 74011250637 HC RX REV CODE- 250/637: Performed by: NURSE ANESTHETIST, CERTIFIED REGISTERED

## 2022-02-28 PROCEDURE — 49650 LAP ING HERNIA REPAIR INIT: CPT | Performed by: SURGERY

## 2022-02-28 PROCEDURE — 77030020829: Performed by: SURGERY

## 2022-02-28 PROCEDURE — 76210000034 HC AMBSU PH I REC 0.5 TO 1 HR: Performed by: SURGERY

## 2022-02-28 PROCEDURE — 77030010299 HC STPLR INT COVD -E: Performed by: SURGERY

## 2022-02-28 PROCEDURE — 74011250636 HC RX REV CODE- 250/636

## 2022-02-28 PROCEDURE — 77030008771 HC TU NG SALEM SUMP -A: Performed by: ANESTHESIOLOGY

## 2022-02-28 PROCEDURE — 74011250636 HC RX REV CODE- 250/636: Performed by: SURGERY

## 2022-02-28 DEVICE — MESH HERN W6XL6IN INGUINAL POLYPR MFIL SQ NONABSORBABLE: Type: IMPLANTABLE DEVICE | Site: ABDOMEN | Status: FUNCTIONAL

## 2022-02-28 RX ORDER — SODIUM CHLORIDE 0.9 % (FLUSH) 0.9 %
5-40 SYRINGE (ML) INJECTION AS NEEDED
Status: DISCONTINUED | OUTPATIENT
Start: 2022-02-28 | End: 2022-02-28 | Stop reason: HOSPADM

## 2022-02-28 RX ORDER — ROCURONIUM BROMIDE 10 MG/ML
INJECTION, SOLUTION INTRAVENOUS AS NEEDED
Status: DISCONTINUED | OUTPATIENT
Start: 2022-02-28 | End: 2022-02-28 | Stop reason: HOSPADM

## 2022-02-28 RX ORDER — FENTANYL CITRATE 50 UG/ML
25 INJECTION, SOLUTION INTRAMUSCULAR; INTRAVENOUS
Status: DISCONTINUED | OUTPATIENT
Start: 2022-02-28 | End: 2022-02-28 | Stop reason: HOSPADM

## 2022-02-28 RX ORDER — SUCCINYLCHOLINE CHLORIDE 20 MG/ML
INJECTION INTRAMUSCULAR; INTRAVENOUS AS NEEDED
Status: DISCONTINUED | OUTPATIENT
Start: 2022-02-28 | End: 2022-02-28 | Stop reason: HOSPADM

## 2022-02-28 RX ORDER — PHENYLEPHRINE HCL IN 0.9% NACL 0.4MG/10ML
SYRINGE (ML) INTRAVENOUS AS NEEDED
Status: DISCONTINUED | OUTPATIENT
Start: 2022-02-28 | End: 2022-02-28 | Stop reason: HOSPADM

## 2022-02-28 RX ORDER — ALBUTEROL SULFATE 90 UG/1
AEROSOL, METERED RESPIRATORY (INHALATION) AS NEEDED
Status: DISCONTINUED | OUTPATIENT
Start: 2022-02-28 | End: 2022-02-28 | Stop reason: HOSPADM

## 2022-02-28 RX ORDER — SODIUM CHLORIDE, SODIUM LACTATE, POTASSIUM CHLORIDE, CALCIUM CHLORIDE 600; 310; 30; 20 MG/100ML; MG/100ML; MG/100ML; MG/100ML
25 INJECTION, SOLUTION INTRAVENOUS CONTINUOUS
Status: DISCONTINUED | OUTPATIENT
Start: 2022-02-28 | End: 2022-02-28 | Stop reason: HOSPADM

## 2022-02-28 RX ORDER — MORPHINE SULFATE 10 MG/ML
2 INJECTION, SOLUTION INTRAMUSCULAR; INTRAVENOUS
Status: DISCONTINUED | OUTPATIENT
Start: 2022-02-28 | End: 2022-02-28 | Stop reason: HOSPADM

## 2022-02-28 RX ORDER — POLYETHYLENE GLYCOL 3350 17 G/17G
17 POWDER, FOR SOLUTION ORAL 2 TIMES DAILY
Qty: 60 PACKET | Refills: 0 | Status: SHIPPED | OUTPATIENT
Start: 2022-02-28 | End: 2022-03-30

## 2022-02-28 RX ORDER — OXYCODONE AND ACETAMINOPHEN 5; 325 MG/1; MG/1
1 TABLET ORAL
Qty: 12 TABLET | Refills: 0 | Status: SHIPPED | OUTPATIENT
Start: 2022-02-28 | End: 2022-03-03

## 2022-02-28 RX ORDER — DEXMEDETOMIDINE HYDROCHLORIDE 100 UG/ML
INJECTION, SOLUTION INTRAVENOUS AS NEEDED
Status: DISCONTINUED | OUTPATIENT
Start: 2022-02-28 | End: 2022-02-28 | Stop reason: HOSPADM

## 2022-02-28 RX ORDER — LIDOCAINE HYDROCHLORIDE 10 MG/ML
0.1 INJECTION, SOLUTION EPIDURAL; INFILTRATION; INTRACAUDAL; PERINEURAL AS NEEDED
Status: DISCONTINUED | OUTPATIENT
Start: 2022-02-28 | End: 2022-02-28 | Stop reason: HOSPADM

## 2022-02-28 RX ORDER — PROPOFOL 10 MG/ML
INJECTION, EMULSION INTRAVENOUS AS NEEDED
Status: DISCONTINUED | OUTPATIENT
Start: 2022-02-28 | End: 2022-02-28 | Stop reason: HOSPADM

## 2022-02-28 RX ORDER — ONDANSETRON 2 MG/ML
INJECTION INTRAMUSCULAR; INTRAVENOUS AS NEEDED
Status: DISCONTINUED | OUTPATIENT
Start: 2022-02-28 | End: 2022-02-28 | Stop reason: HOSPADM

## 2022-02-28 RX ORDER — KETOROLAC TROMETHAMINE 30 MG/ML
INJECTION, SOLUTION INTRAMUSCULAR; INTRAVENOUS
Status: COMPLETED
Start: 2022-02-28 | End: 2022-02-28

## 2022-02-28 RX ORDER — ACETAMINOPHEN 500 MG
TABLET ORAL
Status: COMPLETED
Start: 2022-02-28 | End: 2022-02-28

## 2022-02-28 RX ORDER — BUPIVACAINE HYDROCHLORIDE AND EPINEPHRINE 5; 5 MG/ML; UG/ML
INJECTION, SOLUTION EPIDURAL; INTRACAUDAL; PERINEURAL AS NEEDED
Status: DISCONTINUED | OUTPATIENT
Start: 2022-02-28 | End: 2022-02-28 | Stop reason: HOSPADM

## 2022-02-28 RX ORDER — OXYCODONE AND ACETAMINOPHEN 5; 325 MG/1; MG/1
1 TABLET ORAL
Status: DISCONTINUED | OUTPATIENT
Start: 2022-02-28 | End: 2022-02-28 | Stop reason: HOSPADM

## 2022-02-28 RX ORDER — MIDAZOLAM HYDROCHLORIDE 1 MG/ML
INJECTION, SOLUTION INTRAMUSCULAR; INTRAVENOUS AS NEEDED
Status: DISCONTINUED | OUTPATIENT
Start: 2022-02-28 | End: 2022-02-28 | Stop reason: HOSPADM

## 2022-02-28 RX ORDER — IBUPROFEN 800 MG/1
800 TABLET ORAL
Qty: 30 TABLET | Refills: 0 | Status: SHIPPED | OUTPATIENT
Start: 2022-02-28 | End: 2022-03-07 | Stop reason: SDUPTHER

## 2022-02-28 RX ORDER — SODIUM CHLORIDE 0.9 % (FLUSH) 0.9 %
5-40 SYRINGE (ML) INJECTION EVERY 8 HOURS
Status: DISCONTINUED | OUTPATIENT
Start: 2022-02-28 | End: 2022-02-28 | Stop reason: HOSPADM

## 2022-02-28 RX ORDER — DEXAMETHASONE SODIUM PHOSPHATE 4 MG/ML
INJECTION, SOLUTION INTRA-ARTICULAR; INTRALESIONAL; INTRAMUSCULAR; INTRAVENOUS; SOFT TISSUE AS NEEDED
Status: DISCONTINUED | OUTPATIENT
Start: 2022-02-28 | End: 2022-02-28 | Stop reason: HOSPADM

## 2022-02-28 RX ORDER — KETOROLAC TROMETHAMINE 30 MG/ML
30 INJECTION, SOLUTION INTRAMUSCULAR; INTRAVENOUS
Status: COMPLETED | OUTPATIENT
Start: 2022-02-28 | End: 2022-02-28

## 2022-02-28 RX ORDER — WATER FOR INJECTION,STERILE
VIAL (ML) INJECTION
Status: DISCONTINUED
Start: 2022-02-28 | End: 2022-02-28 | Stop reason: HOSPADM

## 2022-02-28 RX ORDER — DROPERIDOL 2.5 MG/ML
0.62 INJECTION, SOLUTION INTRAMUSCULAR; INTRAVENOUS AS NEEDED
Status: DISCONTINUED | OUTPATIENT
Start: 2022-02-28 | End: 2022-02-28 | Stop reason: HOSPADM

## 2022-02-28 RX ORDER — HYDROMORPHONE HYDROCHLORIDE 2 MG/ML
INJECTION, SOLUTION INTRAMUSCULAR; INTRAVENOUS; SUBCUTANEOUS AS NEEDED
Status: DISCONTINUED | OUTPATIENT
Start: 2022-02-28 | End: 2022-02-28 | Stop reason: HOSPADM

## 2022-02-28 RX ORDER — FENTANYL CITRATE 50 UG/ML
INJECTION, SOLUTION INTRAMUSCULAR; INTRAVENOUS AS NEEDED
Status: DISCONTINUED | OUTPATIENT
Start: 2022-02-28 | End: 2022-02-28 | Stop reason: HOSPADM

## 2022-02-28 RX ORDER — HYDROMORPHONE HYDROCHLORIDE 1 MG/ML
.2-.5 INJECTION, SOLUTION INTRAMUSCULAR; INTRAVENOUS; SUBCUTANEOUS ONCE
Status: DISCONTINUED | OUTPATIENT
Start: 2022-02-28 | End: 2022-02-28 | Stop reason: HOSPADM

## 2022-02-28 RX ORDER — LIDOCAINE HYDROCHLORIDE 20 MG/ML
INJECTION, SOLUTION EPIDURAL; INFILTRATION; INTRACAUDAL; PERINEURAL AS NEEDED
Status: DISCONTINUED | OUTPATIENT
Start: 2022-02-28 | End: 2022-02-28 | Stop reason: HOSPADM

## 2022-02-28 RX ORDER — DIPHENHYDRAMINE HYDROCHLORIDE 50 MG/ML
12.5 INJECTION, SOLUTION INTRAMUSCULAR; INTRAVENOUS AS NEEDED
Status: DISCONTINUED | OUTPATIENT
Start: 2022-02-28 | End: 2022-02-28 | Stop reason: HOSPADM

## 2022-02-28 RX ADMIN — Medication 80 MCG: at 14:23

## 2022-02-28 RX ADMIN — CEFAZOLIN 2 G: 1 INJECTION, POWDER, FOR SOLUTION INTRAMUSCULAR; INTRAVENOUS; PARENTERAL at 13:55

## 2022-02-28 RX ADMIN — ROCURONIUM BROMIDE 30 MG: 10 INJECTION INTRAVENOUS at 14:10

## 2022-02-28 RX ADMIN — FENTANYL CITRATE 25 MCG: 50 INJECTION, SOLUTION INTRAMUSCULAR; INTRAVENOUS at 15:18

## 2022-02-28 RX ADMIN — ONDANSETRON HYDROCHLORIDE 4 MG: 2 INJECTION, SOLUTION INTRAMUSCULAR; INTRAVENOUS at 14:35

## 2022-02-28 RX ADMIN — DEXMEDETOMIDINE HYDROCHLORIDE 10 MCG: 100 INJECTION, SOLUTION, CONCENTRATE INTRAVENOUS at 14:10

## 2022-02-28 RX ADMIN — HYDROMORPHONE HYDROCHLORIDE 0.2 MG: 2 INJECTION, SOLUTION INTRAMUSCULAR; INTRAVENOUS; SUBCUTANEOUS at 14:48

## 2022-02-28 RX ADMIN — KETOROLAC TROMETHAMINE 30 MG: 30 INJECTION, SOLUTION INTRAMUSCULAR; INTRAVENOUS at 15:30

## 2022-02-28 RX ADMIN — ROCURONIUM BROMIDE 10 MG: 10 INJECTION INTRAVENOUS at 14:00

## 2022-02-28 RX ADMIN — SODIUM CHLORIDE, POTASSIUM CHLORIDE, SODIUM LACTATE AND CALCIUM CHLORIDE 25 ML/HR: 600; 310; 30; 20 INJECTION, SOLUTION INTRAVENOUS at 13:27

## 2022-02-28 RX ADMIN — FENTANYL CITRATE 25 MCG: 50 INJECTION, SOLUTION INTRAMUSCULAR; INTRAVENOUS at 15:10

## 2022-02-28 RX ADMIN — ALBUTEROL SULFATE 8 PUFF: 90 AEROSOL, METERED RESPIRATORY (INHALATION) at 14:16

## 2022-02-28 RX ADMIN — ACETAMINOPHEN 1000 MG: 500 TABLET ORAL at 13:47

## 2022-02-28 RX ADMIN — HYDROMORPHONE HYDROCHLORIDE 0.4 MG: 2 INJECTION, SOLUTION INTRAMUSCULAR; INTRAVENOUS; SUBCUTANEOUS at 14:38

## 2022-02-28 RX ADMIN — LIDOCAINE HYDROCHLORIDE 80 MG: 20 INJECTION, SOLUTION EPIDURAL; INFILTRATION; INTRACAUDAL; PERINEURAL at 14:00

## 2022-02-28 RX ADMIN — SUGAMMADEX 400 MG: 100 INJECTION, SOLUTION INTRAVENOUS at 14:48

## 2022-02-28 RX ADMIN — Medication 80 MCG: at 14:28

## 2022-02-28 RX ADMIN — MEPERIDINE HYDROCHLORIDE 12.5 MG: 25 INJECTION, SOLUTION INTRAMUSCULAR; INTRAVENOUS; SUBCUTANEOUS at 15:20

## 2022-02-28 RX ADMIN — SODIUM CHLORIDE, POTASSIUM CHLORIDE, SODIUM LACTATE AND CALCIUM CHLORIDE 25 ML/HR: 600; 310; 30; 20 INJECTION, SOLUTION INTRAVENOUS at 15:09

## 2022-02-28 RX ADMIN — SUCCINYLCHOLINE CHLORIDE 160 MG: 20 INJECTION, SOLUTION INTRAMUSCULAR; INTRAVENOUS at 14:01

## 2022-02-28 RX ADMIN — FENTANYL CITRATE 100 MCG: 50 INJECTION, SOLUTION INTRAMUSCULAR; INTRAVENOUS at 13:57

## 2022-02-28 RX ADMIN — DEXAMETHASONE SODIUM PHOSPHATE 8 MG: 4 INJECTION, SOLUTION INTRAMUSCULAR; INTRAVENOUS at 14:05

## 2022-02-28 RX ADMIN — MIDAZOLAM HYDROCHLORIDE 2 MG: 1 INJECTION, SOLUTION INTRAMUSCULAR; INTRAVENOUS at 13:53

## 2022-02-28 RX ADMIN — Medication 80 MCG: at 14:11

## 2022-02-28 RX ADMIN — HYDROMORPHONE HYDROCHLORIDE 0.4 MG: 2 INJECTION, SOLUTION INTRAMUSCULAR; INTRAVENOUS; SUBCUTANEOUS at 15:00

## 2022-02-28 RX ADMIN — PROPOFOL 200 MG: 10 INJECTION, EMULSION INTRAVENOUS at 14:00

## 2022-02-28 RX ADMIN — Medication 80 MCG: at 14:14

## 2022-02-28 RX ADMIN — Medication 80 MCG: at 14:08

## 2022-02-28 NOTE — PERIOP NOTES
[facility]  1975  [id number]    Situation:  Verbal report given from: RN and CRNA  Procedure: Procedure(s):  LAPAROSCOPIC TOTALLY EXTRA PERITONEAL PREPERITONEAL BILATERAL INGUINAL HERNIA REPAIR WITH MESH    Background:    Preoperative diagnosis: BILATERAL INGUINAL HERNIA    Postoperative diagnosis: BILATERAL INGUINAL HERNIA    :  Dr. Lance Alfaro    Assistant(s): Circ-1: Erin Ochoa  Circ-2: Aracelis Ramirez RN  Scrub Tech-1: Madison Collins  Surg Asst-1: Paula Comment    Specimens: * No specimens in log *    Assessment:  Intra-procedure medications         Anesthesia gave intra-procedure sedation and medications, see anesthesia flow sheet     Intravenous fluids: LR@ KVO     Vital signs stable. Pt responsive and complaining of little pain. CRNA medicated with 0.4mg  Dilaudid IV.        Recommendation:    Permission to share finding with wife : yes

## 2022-02-28 NOTE — ANESTHESIA PREPROCEDURE EVALUATION
Relevant Problems   NEUROLOGY   (+) Depression, unspecified depression type   (+) JOSE G (generalized anxiety disorder)      CARDIOVASCULAR   (+) Essential hypertension       Anesthetic History   No history of anesthetic complications            Review of Systems / Medical History  Patient summary reviewed, nursing notes reviewed and pertinent labs reviewed    Pulmonary          Shortness of breath (occasional d/t smoking), undiagnosed apnea and smoker (1 ppd)    Pertinent negatives: No COPD  Comments: STOP BANG 5  Immunized [place], so has +PPD, neg CXR   Neuro/Psych         Psychiatric history    Comments: Anxiety  Depression  Idiopathic peripheral neuropathy Cardiovascular    Hypertension          Hyperlipidemia    Exercise tolerance: >4 METS  Comments: Benign heart murmur   GI/Hepatic/Renal  Within defined limits              Endo/Other            Comments: prediabetes Other Findings   Comments: Bilateral Inguinal hernias    ETOH: 12 beers/week         Physical Exam    Airway  Mallampati: III  TM Distance: > 6 cm  Neck ROM: normal range of motion   Mouth opening: Normal     Cardiovascular    Rhythm: regular  Rate: normal         Dental  No notable dental hx       Pulmonary  Breath sounds clear to auscultation               Abdominal  GI exam deferred       Other Findings            Anesthetic Plan    ASA: 2  Anesthesia type: general    Monitoring Plan: BIS      Induction: Intravenous  Anesthetic plan and risks discussed with: Patient and Spouse      Pt speaks [place] and some [place].  Wife assisted with translation for H&P and consent

## 2022-02-28 NOTE — BRIEF OP NOTE
Brief Postoperative Note    Patient: Asim Biggs  YOB: 1975  MRN: [id number]    Date of Procedure: 2/28/2022     Pre-Op Diagnosis: BILATERAL INGUINAL HERNIA    Post-Op Diagnosis: Same as preoperative diagnosis. Procedure(s):  LAPAROSCOPIC TOTALLY EXTRA PERITONEAL PREPERITONEAL BILATERAL INGUINAL HERNIA REPAIR WITH MESH    Surgeon(s):  Cece Gaffney MD    Surgical Assistant: Surg Asst-1: Renay Delong    Anesthesia: General     Estimated Blood Loss (mL): Minimal    Complications: None    Specimens: * No specimens in log *     Implants:   Implant Name Type Inv.  Item Serial No.  Lot No. LRB No. Used Action   MESH ADOLFO FLAT SHT [id number] --  - [id number]  MESH ADOLFO FLAT SHT [id number] --  [id number] BARD DAVOL_ [id number]  1 Implanted       Drains: * No LDAs found *    Findings: bilateral indirect    Electronically Signed by Kiersten Monreal MD on 2/28/2022 at 2:45 PM

## 2022-02-28 NOTE — PERIOP NOTES
Pain at pain scale of 9-25 mcq Fentanyl given. 1600 Pt. Alert. Denies chill and pain tolerable. Discharge instructions reviewed with caregiver and patient. Allowed and answered questions. Tolerating PO fluids. Both state ready for discharge.    [id number] Discharge to car after voiding good amount

## 2022-02-28 NOTE — PERIOP NOTES
Air Warming blanket placed on pt; turned on for comfort    Permission received to review discharge instructions and discuss private health information with wife and will have someone with them after discharge

## 2022-02-28 NOTE — H&P
HISTORY OF PRESENT ILLNESS  Stephen Calderón is a 42 y.o. male who comes in for consultation by Alejandro Guajardo MD for a hernia  HPI  He has noted right groin swelling and discomfort for about 6 months. It is getting worse but goes away when he lays down. He does not recall an inciting event. He reports some indigestion and intermittent diarrhea/constipation but denies nausea, vomiting, melena, hematochezia, dysuria or hematuria. He has not had surgery on the area previously.          Past Medical History:   Diagnosis Date    Anxiety      Depression      Hypercholesterolemia      Hypertension        History reviewed. No pertinent surgical history. Family History   Problem Relation Age of Onset    Hypertension Mother      Heart Disease Father      Diabetes Brother        Social History            Tobacco Use    Smoking status: Current Every Day Smoker       Packs/day: 1.00       Years: 30.00       Pack years: 30.00    Smokeless tobacco: Never Used   Substance Use Topics    Alcohol use: Yes       Alcohol/week: 15.0 standard drinks       Types: 15 Cans of beer per week       Comment: pt reports about 2 beer per day    Drug use: Not Currently      Current Outpatient Medications   Medication Sig    atorvastatin (LIPITOR) 80 mg tablet Take 1 Tablet by mouth daily.  hydroCHLOROthiazide (HYDRODIURIL) 25 mg tablet Take 1 Tablet by mouth daily.  amLODIPine (NORVASC) 10 mg tablet Take 1 Tablet by mouth daily. (Patient not taking: Reported on 1/25/2022)      No current facility-administered medications for this visit.      No Known Allergies     Review of Systems   Constitutional: Negative for chills, diaphoresis, fever and weight loss. HENT: Negative for sore throat. Eyes: Negative for blurred vision and discharge. Respiratory: Positive for cough and wheezing. Negative for shortness of breath. Cardiovascular: Negative for chest pain, palpitations, orthopnea, claudication and leg swelling. Gastrointestinal: Positive for abdominal pain, constipation, diarrhea and heartburn. Negative for melena, nausea and vomiting. Genitourinary: Negative for dysuria, flank pain, frequency and hematuria. Musculoskeletal: Negative for back pain, joint pain, myalgias and neck pain. Skin: Negative for rash. Neurological: Negative for dizziness, speech change, focal weakness, seizures, loss of consciousness, weakness and headaches. Endo/Heme/Allergies: Does not bruise/bleed easily. Psychiatric/Behavioral: Positive for depression. Negative for memory loss. The patient is nervous/anxious.       Visit Vitals  /60 (BP 1 Location: Left upper arm, BP Patient Position: Sitting)   Pulse 95   Temp 97.5 °F (36.4 °C) (Temporal)   Resp 16   Ht 6' 2\" (1.88 m)   Wt 95.9 kg (211 lb 8 oz)   SpO2 96%   BMI 27.15 kg/m²         Physical Exam  Constitutional:       General: He is not in acute distress. Appearance: Normal appearance. He is well-developed. He is not diaphoretic. HENT:      Head: Normocephalic and atraumatic. Mouth/Throat:      Pharynx: No oropharyngeal exudate. Eyes:      General: No scleral icterus. Conjunctiva/sclera: Conjunctivae normal.      Pupils: Pupils are equal, round, and reactive to light. Neck:      Thyroid: No thyromegaly. Trachea: No tracheal deviation. Cardiovascular:      Rate and Rhythm: Normal rate and regular rhythm. Heart sounds: Normal heart sounds. No murmur heard. No friction rub. No gallop. Pulmonary:      Effort: Pulmonary effort is normal. No respiratory distress. Breath sounds: No stridor. Examination of the left-upper field reveals wheezing. Wheezing present. No rales. Abdominal:      General: Bowel sounds are normal. There is no distension. Palpations: Abdomen is soft. There is no mass. Tenderness: There is no abdominal tenderness. There is no right CVA tenderness, left CVA tenderness, guarding or rebound.  Negative signs include Ramirez's sign and Rovsing's sign. Hernia: A hernia is present. Hernia is present in the left inguinal area (small reducible) and right inguinal area (medium reducible). There is no hernia in the umbilical area. Genitourinary:     Penis: Circumcised. Testes: Normal. Cremasteric reflex is present. Musculoskeletal:         General: No tenderness. Normal range of motion. Cervical back: Normal range of motion and neck supple. Lymphadenopathy:      Cervical: No cervical adenopathy. Skin:     General: Skin is warm and dry. Findings: No erythema or rash. Neurological:      Mental Status: He is alert and oriented to person, place, and time. Cranial Nerves: No cranial nerve deficit. Coordination: Coordination normal.   Psychiatric:         Behavior: Behavior normal.         Thought Content: Thought content normal.         Judgment: Judgment normal.            ASSESSMENT and PLAN  1. BIH. I explained about the anatomy and pathophysiology of hernias and the risk of incarceration and strangulation of the bowel. I explained about hernia repairs (open with and without mesh, and robotic assisted and laparoscopic with mesh). I explained the risks and benefits of repair including bleeding, infection, chronic pain, orchalgia, loss of testes, bowel or bladder injury, hernia recurrence, seroma, mesh infection requiring removal.  I explained it would be a six to eight week recuperation with no driving for 5 - 7 days, no lifting for six weeks. 2.  Tobacco abuse. 1 ppd. Recommended cessation  3. Essential hypertension. Controlled on rx  4. Vaccinated for Covid-19. Had Major Peter booster 1/2022  5. Social:  . Here with wife.   Works as a                  He wishes to proceed with a laparoscopic totally extraperitoneal preperitoneal bilateral inguinal hernia repair with mesh under general anesthesia as an outpatient      The patient was counseled at length about the risks of jon Covid-19 in the darya-operative and post-operative states including the recovery window of their procedure.  The patient was made aware that jon Covid-19 after a surgical procedure may worsen their prognosis for recovering from the virus and lend to a higher morbidity and or mortality risk.  The patient was given the options of postponing their procedure. All of the risks, benefits, and alternatives were discussed.  The patient  wishes to proceed with the procedure.        Jackie Palomo MD FACS

## 2022-02-28 NOTE — ANESTHESIA POSTPROCEDURE EVALUATION
Procedure(s):  LAPAROSCOPIC TOTALLY EXTRA PERITONEAL PREPERITONEAL BILATERAL INGUINAL HERNIA REPAIR WITH MESH.     general    Anesthesia Post Evaluation      Multimodal analgesia: multimodal analgesia used between 6 hours prior to anesthesia start to PACU discharge  Patient location during evaluation: PACU  Patient participation: complete - patient participated  Level of consciousness: awake and alert  Pain score: 4  Pain management: satisfactory to patient  Airway patency: patent  Anesthetic complications: no  Cardiovascular status: acceptable  Respiratory status: acceptable  Hydration status: acceptable  Post anesthesia nausea and vomiting:  none  Final Post Anesthesia Temperature Assessment:  Normothermia (36.0-37.5 degrees C)      INITIAL Post-op Vital signs:   Vitals Value Taken Time   /80 02/28/22 1545   Temp 36.8 °C (98.3 °F) 02/28/22 1545   Pulse 85 02/28/22 1545   Resp 14 02/28/22 1545   SpO2 94 % 02/28/22 1545

## 2022-02-28 NOTE — DISCHARGE INSTRUCTIONS
Discharge Instructions:  Hernia Repair    Dr. Thaddeus Espinosa    Call for appointment for follow up in 2 weeks [phone]    Activity:    Walk regularly. No lifting more than 10 pounds for 6 weeks. Light aerobic activity is okay when you feel up to it. You may resume driving in five days unless still requiring narcotics for pain. Work:    You may return to work in 2 or 3 weeks to light activity. No lifting more than 10 pounds for 6 weeks. Diet:    You may resume normal diet after 24 hours. Anesthesia and narcotics may cause nausea and vomiting. If persistent please call the office. Wound Care: You have a special dressing called Dermabond. It is okay to shower and let the water run over the incisions but do not scrub the area or soak in a tub. If you have a small amount of drainage you may place a dry bandage over the wound and change it daily. If you experience a lot of drainage, develop redness around the wound, or a fever over 101 F occurs please call the office. Use ice over both groins for 20 minutes every 1-2 hours to reduce swelling and discomfort during the first three post op days. Medications:    Resume home medications as indicated on the Medical Reconciliation form. Aspirin and Coumadin can be restarted immediately if you were taking them preoperatively. If taking Plavix do not restart it until post operative day 2. Pain medications:  Non steroidal antiinflammatories seem to work best for post surgical pain. Try these first as prescribed. A narcotic prescription will also be given for breakthrough pain. Over the counter stool softeners and laxatives may be used if needed. Do not hesitate to call with questions or concerns. You received Toradol in the recovery room. You may not take any form of NSAIDS (non steroidal anti inflammatory drugs) such as Advil, Ibuprofen, Aleve, Motrin until after 9:40pm tonight.     You received Tylenol 1000mg prior to your surgery, you may take Tylenol (or pain medication containing Tylenol or Acetaminophen) in 6 hours at 6:45pm    DO NOT TAKE TYLENOL/ACETAMINOPHEN WITH PERCOCET, [facility], [address] TAKE NARCOTIC PAIN MEDICATIONS WITH FOOD     Narcotics tend to be constipating, we suggest taking a stool softener such as Colace or Miralax (follow package instructions). DO NOT DRIVE WHILE TAKING NARCOTIC PAIN MEDICATIONS. DO NOT TAKE SLEEPING MEDICATIONS OR ANTIANXIETY MEDICATIONS WHILE TAKING NARCOTIC PAIN MEDICATIONS,  ESPECIALLY THE NIGHT OF ANESTHESIA! CPAP PATIENTS BE SURE TO WEAR MACHINE WHENEVER NAPPING OR SLEEPING! DISCHARGE SUMMARY from Nurse    The following personal items collected during your admission are returned to you:   Dental Appliance: Dental Appliances: None  Vision: Visual Aid: None  Hearing Aid:    Jewelry: Jewelry: None  Clothing: Clothing: With patient  Other Valuables: Other Valuables: None  Valuables sent to safe:        PATIENT INSTRUCTIONS:    After General Anesthesia or Intravenous Sedation, for 24 hours or while taking prescription Narcotics:        Someone should be with you for the next 24 hours. For your own safety, a responsible adult must drive you home. · Limit your activities  · Recommended activity: Rest today, up with assistance today. Do not climb stairs or shower unattended for the next 24 hours. · Please start with a soft bland diet and advance as tolerated (no nausea) to regular diet. · If you have a sore throat you should try the following: fluids, warm salt water gargles, or throat lozenges. If it does not improve after several days please follow up with your primary physician. · Do not drive and operate hazardous machinery  · Do not make important personal or business decisions  · Do  not drink alcoholic beverages  · If you have not urinated within 8 hours after discharge, please contact your surgeon on call.     Report the following to your surgeon:  · Excessive pain, swelling, redness or odor of or around the surgical area  · Temperature over 100.5  · Nausea and vomiting lasting longer than 4 hours or if unable to take medications  · Any signs of decreased circulation or nerve impairment to extremity: change in color, persistent  numbness, tingling, coldness or increase pain      · You will receive a Post Operative Call from one of the Recovery Room Nurses on the day after your surgery to check on you. It is very important for us to know how you are recovering after your surgery. If you have an issue or need to speak with someone, please call your surgeon, do not wait for the post operative call. · You may receive an e-mail or letter in the mail from [employer] regarding your experience with us in the Ambulatory Surgery Unit. Your feedback is valuable to us and we appreciate your participation in the survey. · If the above instructions are not adequate or you are having problems after your surgery, call the physician at their office number. · We wish you a speedy recovery ? TO PREVENT AN INFECTION      1. [address] YOUR HANDS     To prevent infection, good handwashing is the most important thing you or your caregiver can do.  Wash your hands with soap and water or use the hand  we gave you before you touch any wounds. 2. SHOWER     Use the antibacterial soap we gave you when you take a shower.  Shower with this soap until your wounds are healed.  To reach all areas of your body, you may need someone to help you.  Dont forget to clean your belly button with every shower. 3.  USE CLEAN SHEETS     Use freshly cleaned sheets on your bed after surgery.  To keep the surgery site clean, do not allow pets to sleep with you while your wound is still healing. 4. STOP SMOKING     Stop smoking, or at least cut back on smoking     Smoking slows your healing.        If you are diabetic, control your blood sugar levels before and after your surgery. What to do at Home:      *  Please give a list of your current medications to your Primary Care Provider. *  Please update this list whenever your medications are discontinued, doses are      changed, or new medications (including over-the-counter products) are added. *  Please carry medication information at all times in case of emergency situations. If you have not received your influenza and/or pneumococcal vaccine, please follow up with your primary care physician. The discharge information has been reviewed with the patient and caregiver. The patient and caregiver verbalized understanding.

## 2022-03-01 NOTE — OP NOTES
[address]  OPERATIVE REPORT    Name:  Jameel Morin  MR#:  [de-identified]  :  [id number]  ACCOUNT #:  [de-identified]  DATE OF SERVICE:  2022    PREOPERATIVE DIAGNOSIS:  Bilateral inguinal hernias. POSTOPERATIVE DIAGNOSIS:  Bilateral inguinal hernias. PROCEDURE PERFORMED:  Laparoscopic totally extraperitoneal, preperitoneal bilateral inguinal hernia repair with mesh. SURGEON:  Judy Escamilla MD    ASSISTANT:  Derek Valladares. ANESTHESIA:  General.    COMPLICATIONS:  None. SPECIMENS REMOVED:  None. IMPLANTS:  Bard Davol mesh, 15 x 15 cm, Bard Mesh lot# [id number], it was cut in half to create two, one piece on each side. ESTIMATED BLOOD LOSS:  Minimal.    DRAINS:  None. FINDINGS:  Bilateral indirect defects. BRIEF HISTORY:  The patient is a 73-year-old gentleman with inguinal hernia. Options were discussed, elected to undergo repair. These are noted in my office notes. He understands the risks and benefits and wishes to proceed. PROCEDURE:  The patient was taken to the operating room, placed on the operating table in the supine position. He underwent general endotracheal anesthesia. After which, the arms were tucked and padded at the side, and the abdomen was prepped and draped in usual sterile fashion. After appropriate time-out and antibiotics were given, 0.5% Marcaine with epinephrine was infiltrated into the skin and subcutaneous tissues 1 cm medially and 1 cm superiorly to the anterior superior iliac crest on each side for regional nerve block as well as into the tissue inferior to the umbilicus. A vertical midline incision was made inferior to the umbilicus and subcutaneous tissues were dissected out bluntly. Electrocautery was used to go through the anterior rectus sheath just to the left of midline. The preperitoneal space was entered and the rectus muscle was mobilized out laterally.   Once that was completed, a [employer] PDBS balloon dissector was inserted into the preperitoneal space and distended under direct visualization. This gave good dissection of the tissue planes. Subsequently that was removed and a structural balloon was inserted in and insufflation zelalem. Maximum 15 mmHg of pressure gave good visualization of the peritoneal space. A 5-mm trocar was placed in the lower midline and one in the left lower quadrant, we subsequently commenced dissection. There were bilateral indirect defects that was somewhat teased and dissected off the cord structures. Ultimately, they were dissected away and a piece of Bard mesh was brought on the field and cut in half to create two 3 x 6 inches pieces of mesh. Once this was cut in half, there was a slit created one-third of the way down the long end for creation of an internal ring on each piece. The mesh was subsequently rolled up and inserted into the preperitoneal space with the long end medially and the tail laterally and utilizing a CovIMT ProTack, we tacked it inferior to the pubis, then down to Kenneth's ligament, and up to shelving edge of the inguinal ligament more superolaterally, and entered the anterior abdominal wall musculature. This was also wrapped around the cord structures and tacked it back to itself to create a snug, but not tight internal ring. Both sides were done in a similar fashion. Once that was completed, CO2 was allowed to evacuate from the preperitoneal space and then interrupted 0 Vicryl was used to reapproximate the anterior rectus sheath. More Marcaine was infiltrated around all the incisions and then interrupted 4-0 Vicryl was used to close the deep tissues and deep dermis, running 4-0 Vicryl was used to close the skin and a Dermabond dressing was applied. Upon completion of the operation, the needle, sponge, and instrument counts were correct x2. The patient had tolerated the procedure well and was brought to recovery room.       Carlo Farmer, MD [id number]  D:  02/28/2022 14:50  T:  02/28/2022 20:08  JOB #:  [id number]  CC:  MD Jazlyn Steele MD

## 2022-03-07 RX ORDER — IBUPROFEN 800 MG/1
TABLET ORAL
Qty: 30 TABLET | Refills: 0 | Status: SHIPPED | OUTPATIENT
Start: 2022-03-07

## 2022-03-15 ENCOUNTER — OFFICE VISIT (OUTPATIENT)
Dept: SURGERY | Age: 47
End: 2022-03-15
Payer: COMMERCIAL

## 2022-03-15 VITALS
HEIGHT: 74 IN | SYSTOLIC BLOOD PRESSURE: 110 MMHG | RESPIRATION RATE: 20 BRPM | WEIGHT: 213.8 LBS | TEMPERATURE: 97.3 F | HEART RATE: 76 BPM | OXYGEN SATURATION: 99 % | DIASTOLIC BLOOD PRESSURE: 64 MMHG | BODY MASS INDEX: 27.44 KG/M2

## 2022-03-15 DIAGNOSIS — Z09 POSTOPERATIVE EXAMINATION: Primary | ICD-10-CM

## 2022-03-15 PROCEDURE — 99024 POSTOP FOLLOW-UP VISIT: CPT | Performed by: SURGERY

## 2022-03-15 NOTE — PROGRESS NOTES
Surgery  Follow up  Procedure: lap TEP BIH with mesh  OR date:  2/28/2022  Path:  none    S I feel fine but some mild left groin pain, no diarrhea    Visit Vitals  /64 (BP 1 Location: Left upper arm, BP Patient Position: Sitting)   Pulse 76   Temp 97.3 °F (36.3 °C) (Temporal)   Resp 20   Ht 6' 2\" (1.88 m)   Wt 97 kg (213 lb 12.8 oz)   SpO2 99%   BMI 27.45 kg/m²       O Incisions healing well without infection   No signs of hernia    A/P Doing well overall   No lifting for another 4 weeks   RTW 3/21 to light duty (10 pounds) until 4/11 and then no restrictions   RTC 6 weeks or prn    Kiersten Monreal MD FACS

## 2022-03-15 NOTE — LETTER
3/15/2022    Patient: Jeremiah Chase   YOB: 1975   Date of Visit: 3/15/2022     Janeth Stanley MD  [address] [place] [address]  Via In Basket    Dear Janeth Stanley MD,      Thank you for referring Mr. Jeremiah Chase to [address] for evaluation. My notes for this consultation are attached. If you have questions, please do not hesitate to call me. I look forward to following your patient along with you.       Sincerely,    Loco Khan MD

## 2022-03-15 NOTE — PROGRESS NOTES
Identified pt with two pt identifiers(name and ). Reviewed record in preparation for visit and have obtained necessary documentation. All patient medications has been reviewed. Chief Complaint   Patient presents with    Surgical Follow-up     LAPAROSCOPIC TOTALLY EXTRA PERITONEAL PREPERITONEAL BILATERAL INGUINAL HERNIA REPAIR WITH MESH 22       Health Maintenance Due   Topic    Pneumococcal 0-64 years (1 of 2 - PPSV23)    Depression Monitoring     COVID-19 Vaccine (3 - Booster for Pfizer series)       Vitals:    03/15/22 0901   BP: 110/64   Pulse: 76   Resp: 20   Temp: 97.3 °F (36.3 °C)   TempSrc: Temporal   SpO2: 99%   Weight: 97 kg (213 lb 12.8 oz)   Height: 6' 2\" (1.88 m)   PainSc:   3   PainLoc: Groin       4. Have you been to the ER, urgent care clinic since your last visit? Hospitalized since your last visit? No    5. Have you seen or consulted any other health care providers outside of the [address] [place] [address] since your last visit? Include any pap smears or colon screening. No      Patient is accompanied by self I have received verbal consent from [facility] to discuss any/all medical information while they are present in the room.

## 2022-03-21 ENCOUNTER — OFFICE VISIT (OUTPATIENT)
Dept: INTERNAL MEDICINE CLINIC | Age: 47
End: 2022-03-21
Payer: COMMERCIAL

## 2022-03-21 VITALS
HEIGHT: 74 IN | OXYGEN SATURATION: 99 % | SYSTOLIC BLOOD PRESSURE: 106 MMHG | RESPIRATION RATE: 16 BRPM | TEMPERATURE: 97.9 F | HEART RATE: 79 BPM | BODY MASS INDEX: 27.08 KG/M2 | WEIGHT: 211 LBS | DIASTOLIC BLOOD PRESSURE: 68 MMHG

## 2022-03-21 DIAGNOSIS — I10 ESSENTIAL HYPERTENSION: Primary | ICD-10-CM

## 2022-03-21 DIAGNOSIS — E78.2 MIXED HYPERLIPIDEMIA: ICD-10-CM

## 2022-03-21 DIAGNOSIS — Z72.0 TOBACCO ABUSE: ICD-10-CM

## 2022-03-21 DIAGNOSIS — F41.1 GAD (GENERALIZED ANXIETY DISORDER): ICD-10-CM

## 2022-03-21 DIAGNOSIS — F32.A DEPRESSION, UNSPECIFIED DEPRESSION TYPE: ICD-10-CM

## 2022-03-21 DIAGNOSIS — L60.0 INGROWN NAIL OF FIFTH TOE: ICD-10-CM

## 2022-03-21 DIAGNOSIS — Z00.00 HEALTHCARE MAINTENANCE: ICD-10-CM

## 2022-03-21 LAB
CHOLEST SERPL-MCNC: 172 MG/DL
HDLC SERPL-MCNC: 39 MG/DL
HDLC SERPL: 4.4 {RATIO} (ref 0–5)
LDLC SERPL CALC-MCNC: 84.4 MG/DL (ref 0–100)
TRIGL SERPL-MCNC: 243 MG/DL (ref ?–150)
VLDLC SERPL CALC-MCNC: 48.6 MG/DL

## 2022-03-21 PROCEDURE — 99214 OFFICE O/P EST MOD 30 MIN: CPT | Performed by: INTERNAL MEDICINE

## 2022-03-21 NOTE — PATIENT INSTRUCTIONS
High Cholesterol: Care Instructions  Overview     Cholesterol is a type of fat in your blood. It is needed for many body functions, such as making new cells. Cholesterol is made by your body. It also comes from food you eat. High cholesterol means that you have too much of the fat in your blood. This raises your risk of a heart attack and stroke. LDL and HDL are part of your total cholesterol. LDL is the \"bad\" cholesterol. High LDL can raise your risk for coronary artery disease, heart attack, and stroke. HDL is the \"good\" cholesterol. It helps clear bad cholesterol from the body. High HDL is linked with a lower risk of coronary artery disease, heart attack, and stroke. Your cholesterol levels help your doctor find out your risk for having a heart attack or stroke. You and your doctor can talk about whether you need to lower your risk and what treatment is best for you. Treatment options include a heart-healthy lifestyle and medicine. Both options can help lower your cholesterol and your risk. The way you choose to lower your risk will depend on how high your risk is for heart attack and stroke. It will also depend on how you feel about taking medicines. Follow-up care is a key part of your treatment and safety. Be sure to make and go to all appointments, and call your doctor if you are having problems. It's also a good idea to know your test results and keep a list of the medicines you take. How can you care for yourself at home? · Eat heart-healthy foods. ? Eat fruits, vegetables, whole grains, beans, and other high-fiber foods. ? Eat lean proteins, such as seafood, lean meats, beans, nuts, and soy products. ? Eat healthy fats, such as canola and olive oil. ? Choose foods that are low in saturated fat. ? Limit sodium and alcohol. ? Limit drinks and foods with added sugar. · Be physically active. Try to do moderate activity at least 2½ hours a week.  Or try to do vigorous activity at least 1¼ hours a week. You may want to walk or try other activities, such as running, swimming, cycling, or playing tennis or team sports. · Stay at a healthy weight or lose weight by making the changes in eating and physical activity listed above. Losing just a small amount of weight, even 5 to 10 pounds, can help reduce your risk for having a heart attack or stroke. · Do not smoke. · Manage other health problems. These include diabetes and high blood pressure. If you think you may have a problem with alcohol or drug use, talk to your doctor. · If you take medicine, take it exactly as prescribed. Call your doctor if you think you are having a problem with your medicine. · Check with your doctor or pharmacist before you use any other medicines, including over-the-counter medicines. Make sure your doctor knows all of the medicines, vitamins, herbal products, and supplements you take. Taking some medicines together can cause problems. When should you call for help? Watch closely for changes in your health, and be sure to contact your doctor if:    · You need help making lifestyle changes.     · You have questions about your medicine. Where can you learn more? Go to http://www.gray.com/  Enter R118 in the search box to learn more about \"High Cholesterol: Care Instructions. \"  Current as of: January 10, 2022               Content Version: 13.2  © 2006-2022 [employer]. Care instructions adapted under license by [employer] (which disclaims liability or warranty for this information). If you have questions about a medical condition or this instruction, always ask your healthcare professional. [place] [address] any warranty or liability for your use of this information.

## 2022-03-21 NOTE — PROGRESS NOTES
Office Visit Note:    Assessment/Plan:  1. Essential hypertension    2. Mixed hyperlipidemia    3. Ingrown nail of fifth toe    4. JOSE G (generalized anxiety disorder)    5. Depression, unspecified depression type    6. Tobacco abuse    7. Healthcare maintenance      1. Hypertension-his blood pressure is well controlled today. Continue on hydrochlorothiazide, amlodipine. He does not check his blood pressure at home. Advised to continue on low-salt diet. 2. Hyperlipidemia-his last lipid panel showed LDL of 238, triglyceride of 327 and a total cholesterol of 352. He is on Lipitor 80 mg started 2 months ago. We will repeat a lipid panel today to see if his lipid numbers are improving. 3. Peripheral neuropathy-referred to neurology last visit, improving  4. L 5th toe ingrown toe nail-refer to podiatry  5. Right inguinal hernia-seeing general surgery   6. Pre diabetes-advised weight loss  7. Tobacco use-he did smoke daily, advised to stop smoking. He will tell me when he is ready to quit smoking he may benefit from being on Wellbutrin. We will plan to give PCV 23 next visit  8. Depression and anxiety-improving  9.  Alcohol use- asked him to cut down his alcohol use    Health Maintenance:  Immunizations:  Tetanus: given 12/16/21  Influenza:given 12/16/21  Covid:  2 doses of pfizer    Screening:  Colon cancer: start at age of 41  Hepatitis C: Negative  HIV: Negative  Follow-up in 3 months    Orders Placed This Encounter    LIPID PANEL     Standing Status:   Future     Number of Occurrences:   1     Standing Expiration Date:   3/21/2023    REFERRAL TO PODIATRY     Referral Priority:   Routine     Referral Type:   Consultation     Referral Reason:   Specialty Services Required     Referred to Provider:   Avel Flores DPM     Number of Visits Requested:   1     Social Determinants of Health     Tobacco Use: High Risk    Smoking Tobacco Use: Current Every Day Smoker    Smokeless Tobacco Use: Never Used Alcohol Use:     Frequency of Alcohol Consumption: Not on file    Average Number of Drinks: Not on file    Frequency of Binge Drinking: Not on file   Financial Resource Strain:     Difficulty of Paying Living Expenses: Not on file   Food Insecurity:     Worried About Running Out of Food in the Last Year: Not on file    Leon of Food in the Last Year: Not on file   Transportation Needs:     Lack of Transportation (Medical): Not on file    Lack of Transportation (Non-Medical): Not on file   Physical Activity:     Days of Exercise per Week: Not on file    Minutes of Exercise per Session: Not on file   Stress:     Feeling of Stress : Not on file   Social Connections:     Frequency of Communication with Friends and Family: Not on file    Frequency of Social Gatherings with Friends and Family: Not on file    Attends Christian Services: Not on file    Active Member of [address] [place] [address] [employer] or Organizations: Not on file    Attends Club or Organization Meetings: Not on file    Marital Status: Not on file   Intimate Partner Violence:     Fear of Current or Ex-Partner: Not on file    Emotionally Abused: Not on file    Physically Abused: Not on file    Sexually Abused: Not on file   Depression: Not at risk    PHQ-2 Score: 0   Housing Stability:     Unable to Pay for Housing in the Last Year: Not on file    Number of [place] in the Last Year: Not on file    Unstable Housing in the Last Year: Not on file       Follow-up and Dispositions    · Return in about 3 months (around 6/21/2022). I have reviewed with the patient details of the assessment and plan and all questions were answered. Relevant patient education was performed. The most recent lab findings were reviewed with the patient. An After Visit Summary was printed and given to the patient.     Reason for Visit: Hospital Follow Up, Referral Follow Up, and Toe Pain (left foot; \"little toe\", pain, corn on toe)      Subjective:  62-year-old male who was recently diagnosed with hypertension comes for follow-up. He had significantly elevated lipids. Was started on atorvastatin. At this time he complains of pain in his left fifth toe. He denies any other complaints. He did have some peripheral neuropathy and we referred him to neurology, he is still not seen the neurologist yet. No other complaints    Review of Systems  A complete 11 system ROS was preformed (constitutional, eyes, ENT, cardiovascular, respiratory, gastrointestinal, genitourinary, musculoskeletal, skin, neurological, psychiatric) and was negative aside from the pertinent positives and negatives noted in the HPI. Past Medical History:   Diagnosis Date    Anxiety     At risk for sleep apnea 02/21/2022    during phone assessment, stopbang score 5    Chronic obstructive pulmonary disease (HCC)     Depression     Heart murmur     benign    Hypercholesterolemia     Hypertension     Idiopathic peripheral neuropathy     Prediabetes     TB (tuberculosis)     immunized from country of origin, chest xray negative     Past Surgical History:   Procedure Laterality Date    HX HERNIA REPAIR  02/28/2022    LAPAROSCOPIC TOTALLY EXTRA PERITONEAL PREPERITONEAL BILATERAL INGUINAL HERNIA REPAIR WITH MESH     Social History     Socioeconomic History    Marital status:    Tobacco Use    Smoking status: Current Every Day Smoker     Packs/day: 1.00     Years: 30.00     Pack years: 30.00    Smokeless tobacco: Never Used   Substance and Sexual Activity    Alcohol use:  Yes     Alcohol/week: 15.0 standard drinks     Types: 15 Cans of beer per week     Comment: pt reports about 2 beer per day    Drug use: Not Currently    Sexual activity: Yes     Partners: Female     Family History   Problem Relation Age of Onset    Hypertension Mother     Heart Disease Father     Diabetes Brother        No Known Allergies    Objective:  Visit Vitals  /68 (BP 1 Location: Left upper arm, BP Patient Position: Sitting, BP Cuff Size: Adult)   Pulse 79   Temp 97.9 °F (36.6 °C) (Oral)   Resp 16   Ht 6' 2\" (1.88 m)   Wt 211 lb (95.7 kg)   SpO2 99%   BMI 27.09 kg/m²     Physical Exam:   AA&O x3. not in any distress. HEENT: ENT negative. Lungs: clear  Heart: S1 S2 + flow murmur present  Ingrown toenail in the left fifth toe  Psych: Normal affect and mood. Results for orders placed or performed during the hospital encounter of 02/24/22   SARS-COV-2   Result Value Ref Range    Specimen source NASAL SWAB      SARS-CoV-2 Not detected NOTD     METABOLIC PANEL, BASIC   Result Value Ref Range    Sodium 137 136 - 145 mmol/L    Potassium 3.3 (L) 3.5 - 5.1 mmol/L    Chloride 103 97 - 108 mmol/L    CO2 25 21 - 32 mmol/L    Anion gap 9 5 - 15 mmol/L    Glucose 110 (H) 65 - 100 mg/dL    BUN 12 6 - 20 MG/DL    Creatinine 1.06 0.70 - 1.30 MG/DL    BUN/Creatinine ratio 11 (L) 12 - 20      GFR est AA >60 >60 ml/min/1.73m2    GFR est non-AA >60 >60 ml/min/1.73m2    Calcium 9.5 8.5 - 10.1 MG/DL         Betty Gregg MD, FACP, [facility].   [address] [address], [place], [place].

## 2022-03-21 NOTE — PROGRESS NOTES
Chief Complaint   Patient presents with   [facility] Follow Up    Referral Follow Up     1. Have you been to the ER, urgent care clinic since your last visit? Hospitalized since your last visit? Yes Reason for visit: hernia surgery    2. Have you seen or consulted any other health care providers outside of the [address] [place] [address] since your last visit? Include any pap smears or colon screening.  No

## 2022-05-23 ENCOUNTER — OFFICE VISIT (OUTPATIENT)
Dept: NEUROLOGY | Age: 47
End: 2022-05-23
Payer: COMMERCIAL

## 2022-05-23 VITALS
DIASTOLIC BLOOD PRESSURE: 78 MMHG | HEART RATE: 71 BPM | TEMPERATURE: 97.5 F | RESPIRATION RATE: 16 BRPM | BODY MASS INDEX: 26.77 KG/M2 | HEIGHT: 74 IN | SYSTOLIC BLOOD PRESSURE: 120 MMHG | OXYGEN SATURATION: 97 % | WEIGHT: 208.6 LBS

## 2022-05-23 DIAGNOSIS — G56.03 BILATERAL CARPAL TUNNEL SYNDROME: ICD-10-CM

## 2022-05-23 DIAGNOSIS — R20.2 PARESTHESIA: ICD-10-CM

## 2022-05-23 DIAGNOSIS — M54.50 MYOFASCIAL LOW BACK PAIN: ICD-10-CM

## 2022-05-23 DIAGNOSIS — G62.9 NEUROPATHY: Primary | ICD-10-CM

## 2022-05-23 PROCEDURE — 99205 OFFICE O/P NEW HI 60 MIN: CPT | Performed by: PSYCHIATRY & NEUROLOGY

## 2022-05-23 RX ORDER — BACLOFEN 10 MG/1
10 TABLET ORAL
Qty: 30 TABLET | Refills: 1 | Status: SHIPPED | OUTPATIENT
Start: 2022-05-23

## 2022-05-23 RX ORDER — GABAPENTIN 100 MG/1
100 CAPSULE ORAL 3 TIMES DAILY
Qty: 90 CAPSULE | Refills: 1 | Status: SHIPPED | OUTPATIENT
Start: 2022-05-23

## 2022-05-23 RX ORDER — DICLOFENAC SODIUM 100 MG/1
100 TABLET, FILM COATED, EXTENDED RELEASE ORAL DAILY
Qty: 30 TABLET | Refills: 0 | Status: SHIPPED | OUTPATIENT
Start: 2022-05-23 | End: 2022-06-21

## 2022-05-23 RX ORDER — DEXAMETHASONE 4 MG/1
TABLET ORAL
Qty: 20 TABLET | Refills: 0 | Status: SHIPPED | OUTPATIENT
Start: 2022-05-23

## 2022-05-23 NOTE — LETTER
NOTIFICATION RETURN TO WORK / SCHOOL    5/23/2022 8:44 AM    Mr. Tyrone Gore  [address] [facility]. [facility] [place] [address] [phone]      To Whom It May Concern:    Tyrone Gore is currently under the care of [address] [place] [address]. He was seen in our office today 5/23/22. Please excuse from work. If there are questions or concerns please have the patient contact our office.         Sincerely,      Amarjit Helms MD

## 2022-05-23 NOTE — PROGRESS NOTES
Chief Complaint   Patient presents with    New Patient     numbness in both legs.   from calf to feet and cannot walk long or run

## 2022-05-23 NOTE — PROGRESS NOTES
Neurology Consult Note      HISTORY PROVIDED BY: patient and wife    Chief Complaint:   Chief Complaint   Patient presents with    New Patient     numbness in both legs. from calf to feet and cannot walk long or run      Subjective:    Maya Lopez is a 41 y.o. right handed male who presents in consultation for numbness and tingling sensation in the legs, pain. This is a 33-year-old right-handed black male with history of anxiety disorder, chronic obstructive pulmonary disease, depression, heart murmur, dyslipidemia, hypertension, tuberculosis, prediabetes, who was referred to the clinic to evaluate for numbness and tingling sensation of the legs, pain in the leg muscles. Patient was accompanied by his wife to the visit. According to patient, symptoms started more than 2 years ago and has been progressive. He says the numbness and tingling sensation with pain tend to be worse at night. Patient says he likes to run and walk but because of the pain in the calf with numbness and tingling sensation in the legs he has not been able to do so. He says when he walks on barefoot it appears he as if he is walking on rosy. He says the pain numbness and tingling sensation tend to wake him up at night. On further questioning, patient says has been working for more than 25 years as a , he started out from [place]. Reveals occasional dizziness, blurry vision, fatigue. He denies loss of consciousness, dysphagia or odynophagia.   Review of Systems - History obtained from the patient and wife  General ROS: positive for  - fatigue, night sweats and sleep disturbance  Psychological ROS: positive for - anxiety, depression and sleep disturbances  Ophthalmic ROS: positive for - blurry vision  ENT ROS: negative for - headaches, tinnitus or vertigo  Allergy and Immunology ROS: negative  Hematological and Lymphatic ROS: negative  Endocrine ROS: negative  Respiratory ROS: no cough, shortness of breath, or wheezing  Cardiovascular ROS: no chest pain or dyspnea on exertion  Gastrointestinal ROS: no abdominal pain, change in bowel habits, or black or bloody stools  Genito-Urinary ROS: no dysuria, trouble voiding, or hematuria  Musculoskeletal ROS: positive for - joint pain and muscle pain  Neurological ROS: positive for - impaired coordination/balance, numbness/tingling and weakness  Dermatological ROS: negative  Due to the fact that patient has been a  for a long time, I will evaluate for heavy metal    Past Medical History:   Diagnosis Date    Anxiety     At risk for sleep apnea 02/21/2022    during phone assessment, royal score 5    Chronic obstructive pulmonary disease ([facility] [address]     Depression     Heart murmur     benign    Hypercholesterolemia     Hypertension     Idiopathic peripheral neuropathy     Prediabetes     TB (tuberculosis)     immunized from country of origin, chest xray negative      Past Surgical History:   Procedure Laterality Date    HX HERNIA REPAIR  02/28/2022    LAPAROSCOPIC TOTALLY EXTRA PERITONEAL PREPERITONEAL BILATERAL INGUINAL HERNIA REPAIR WITH MESH      Social History     Socioeconomic History    Marital status:      Spouse name: Not on file    Number of children: Not on file    Years of education: Not on file    Highest education level: Not on file   Occupational History    Not on file   Tobacco Use    Smoking status: Current Every Day Smoker     Packs/day: 1.00     Years: 30.00     Pack years: 30.00    Smokeless tobacco: Never Used   Substance and Sexual Activity    Alcohol use:  Yes     Alcohol/week: 15.0 standard drinks     Types: 15 Cans of beer per week     Comment: pt reports about 2 beer per day    Drug use: Not Currently    Sexual activity: Yes     Partners: Female   Other Topics Concern    Not on file   Social History Narrative    Not on file     Social Determinants of Health     Financial Resource Strain:     Difficulty of Paying Living Expenses: Not on file   Food Insecurity:     Worried About Running Out of Food in the Last Year: Not on file    Leon of Food in the Last Year: Not on file   Transportation Needs:     Lack of Transportation (Medical): Not on file    Lack of Transportation (Non-Medical): Not on file   Physical Activity:     Days of Exercise per Week: Not on file    Minutes of Exercise per Session: Not on file   Stress:     Feeling of Stress : Not on file   Social Connections:     Frequency of Communication with Friends and Family: Not on file    Frequency of Social Gatherings with Friends and Family: Not on file    Attends Confucianism Services: Not on file    Active Member of [address] [place] [address] [employer] or Organizations: Not on file    Attends Club or Organization Meetings: Not on file    Marital Status: Not on file   Intimate Partner Violence:     Fear of Current or Ex-Partner: Not on file    Emotionally Abused: Not on file    Physically Abused: Not on file    Sexually Abused: Not on file   Housing Stability:     Unable to Pay for Housing in the Last Year: Not on file    Number of [place] in the Last Year: Not on file    Unstable Housing in the Last Year: Not on file     Family History   Problem Relation Age of Onset    Hypertension Mother     Heart Disease Father     Diabetes Brother          Objective:   ROS  As per HPI  No Known Allergies     Meds:  Outpatient Medications Prior to Visit   Medication Sig Dispense Refill    ibuprofen (MOTRIN) 800 mg tablet TAKE 1 TABLET BY MOUTH EVERY 8 HOURS FOR UP TO 10 DAYS AS NEEDED FOR PAIN 30 Tablet 0    atorvastatin (LIPITOR) 80 mg tablet Take 1 Tablet by mouth daily. 30 Tablet 2    hydroCHLOROthiazide (HYDRODIURIL) 25 mg tablet Take 1 Tablet by mouth daily. 30 Tablet 2     No facility-administered medications prior to visit. Imaging:  MRI Results (most recent):  No results found for this or any previous visit.      CT Results (most recent):  No results found for this or any previous visit. Reviewed records in [employer] tab today    Lab Review   Results for orders placed or performed in visit on 03/21/22   LIPID PANEL   Result Value Ref Range    Cholesterol, total 172 <200 MG/DL    Triglyceride 243 (H) <150 MG/DL    HDL Cholesterol 39 MG/DL    LDL, calculated 84.4 0 - 100 MG/DL    VLDL, calculated 48.6 MG/DL    CHOL/HDL Ratio 4.4 0.0 - 5.0          Exam:  Visit Vitals  /78 (BP 1 Location: Left upper arm, BP Patient Position: Sitting, BP Cuff Size: Large adult)   Pulse 71   Temp 97.5 °F (36.4 °C) (Temporal)   Resp 16   Ht 6' 2\" (1.88 m)   Wt 208 lb 9.6 oz (94.6 kg)   SpO2 97%   BMI 26.78 kg/m²     General:  Alert, cooperative, no distress. Head:  Normocephalic, without obvious abnormality, atraumatic. Respiratory:  Heart:   Non labored breathing  Regular rate and rhythm, mild systolic murmurs   Neck:   2+ carotids, no bruits   Extremities: Warm, no cyanosis or edema. Pulses: 2+ radial pulses. Neurologic:  MS: Alert and oriented x 4, speech intact. Language intact, able to name, repeat, and follow all commands. Attention and fund of knowledge appropriate. Recent and remote memory intact. Cranial Nerves:  II: visual fields Full to confrontation   II: pupils Equal, round, reactive to light   II: optic disc No papilledema   III,VII: ptosis none   III,IV,VI: extraocular muscles  EOMI, no nystagmus or diplopia   V: facial light touch sensation  normal   VII: facial muscle function   symmetric   VIII: hearing intact   IX: soft palate elevation  normal   XI: trapezius strength  5/5   XI: sternocleidomastoid strength 5/5   XII: tongue  Midline     Motor: normal bulk and tone, no tremor              Strength: 5/5 throughout, no PD  Sensory: Decreased sensation to LT, PP, temperature vibration bilateral lower extremity up to the knee, upper extremity up to the elbow.   Tinel and Phalen signs are positive  Coordination: FTN and HTS intact, RADHA intact,Romberg positive  Gait: Slightly unsteady gait, unable to heel, toe, and tandem walk  Reflexes: 2+ symmetric,  Plantar: Mute           Assessment/Plan       ICD-10-CM ICD-9-CM    1. Neuropathy  G62.9 355.9 gabapentin (NEURONTIN) 100 mg capsule      EMG NCV MOTOR WITH F/WAVE PER NERVE      ALDOLASE      VITAMIN B12      RHEUMATOID FACTOR, QL      SADA, DIRECT, W/REFLEX      ANGIOTENSIN CONVERTING ENZYME      HEAVY METALS PROFILE, UR      RHEUMATOID FACTOR, QL   2. Paresthesia  R20.2 782.0 gabapentin (NEURONTIN) 100 mg capsule      EMG NCV MOTOR WITH F/WAVE PER NERVE      ALDOLASE      RHEUMATOID FACTOR, QL      SADA, DIRECT, W/REFLEX      HEAVY METALS PROFILE, UR      CK      RHEUMATOID FACTOR, QL   3. Bilateral carpal tunnel syndrome  G56.03 354.0 VITAMIN B12      ANGIOTENSIN CONVERTING ENZYME      PROTEIN ELECTROPHORESIS   4. Myofascial low back pain  M54.50 724.2 ALDOLASE      VITAMIN B12      RHEUMATOID FACTOR, QL      SADA, DIRECT, W/REFLEX      PROTEIN ELECTROPHORESIS      CK      RHEUMATOID FACTOR, QL   Plan:  Dexamethasone taper  Neurontin 100 mg p.o. 3 times daily  Baclofen 10 mg p.o. nightly  Diclofenac  mg p.o. daily with food  EMG/nerve conduction study of the lower extremities  Plan for autoimmune work-up, ESR, CK, aldolase, vitamin B12, vitamin D, protein serum electrophoresis. Urine for heavy metals. Follow-up and Dispositions    · Return in about 3 months (around 8/23/2022). Thank you very much for this consultation.    Signed:  Rod Phillips MD  5/23/2022

## 2022-05-24 LAB — RHEUMATOID FACT SERPL-ACNC: <10 IU/ML (ref 0–15)

## 2022-06-20 ENCOUNTER — OFFICE VISIT (OUTPATIENT)
Dept: INTERNAL MEDICINE CLINIC | Age: 47
End: 2022-06-20
Payer: COMMERCIAL

## 2022-06-20 VITALS
SYSTOLIC BLOOD PRESSURE: 136 MMHG | TEMPERATURE: 97.8 F | HEIGHT: 74 IN | BODY MASS INDEX: 27.34 KG/M2 | DIASTOLIC BLOOD PRESSURE: 68 MMHG | WEIGHT: 213 LBS | OXYGEN SATURATION: 99 % | HEART RATE: 88 BPM | RESPIRATION RATE: 16 BRPM

## 2022-06-20 DIAGNOSIS — G60.9 IDIOPATHIC PERIPHERAL NEUROPATHY: ICD-10-CM

## 2022-06-20 DIAGNOSIS — E78.2 MIXED HYPERLIPIDEMIA: ICD-10-CM

## 2022-06-20 DIAGNOSIS — Z72.0 TOBACCO ABUSE: ICD-10-CM

## 2022-06-20 DIAGNOSIS — F32.A DEPRESSION, UNSPECIFIED DEPRESSION TYPE: ICD-10-CM

## 2022-06-20 DIAGNOSIS — R73.03 PREDIABETES: ICD-10-CM

## 2022-06-20 DIAGNOSIS — Z00.00 HEALTHCARE MAINTENANCE: ICD-10-CM

## 2022-06-20 DIAGNOSIS — I10 ESSENTIAL HYPERTENSION: Primary | ICD-10-CM

## 2022-06-20 PROCEDURE — 99214 OFFICE O/P EST MOD 30 MIN: CPT | Performed by: INTERNAL MEDICINE

## 2022-06-20 RX ORDER — HYDROCHLOROTHIAZIDE 25 MG/1
25 TABLET ORAL DAILY
Qty: 90 TABLET | Refills: 2 | Status: SHIPPED | OUTPATIENT
Start: 2022-06-20

## 2022-06-20 RX ORDER — AMLODIPINE BESYLATE 10 MG/1
10 TABLET ORAL DAILY
Qty: 90 TABLET | Refills: 2 | Status: SHIPPED | OUTPATIENT
Start: 2022-06-20

## 2022-06-20 RX ORDER — ATORVASTATIN CALCIUM 80 MG/1
80 TABLET, FILM COATED ORAL DAILY
Qty: 90 TABLET | Refills: 2 | Status: SHIPPED | OUTPATIENT
Start: 2022-06-20 | End: 2022-06-21

## 2022-06-20 NOTE — PROGRESS NOTES
Office Visit Note:    Assessment/Plan:  1. Essential hypertension    2. Mixed hyperlipidemia    3. Prediabetes    4. Idiopathic peripheral neuropathy    5. Depression, unspecified depression type    6. Tobacco abuse    7. Healthcare maintenance      1. Hypertension-his blood pressure is fair, continue on hydrochlorothiazide, amlodipine. He does not check his blood pressure at home. Advised to continue on low-salt diet. 2. Hyperlipidemia-his lipid panel did show significant improvement in March, his total cholesterol came down to 172, LDL came down to 84. Continue on atorvastatin at 80 mg.  3. Peripheral neuropathy-saw neurology, work-up in progress. He states he has an appointment to discuss the lab results  4. Right inguinal hernia-s/p repair   5. Pre diabetes-advised weight loss  6. Tobacco use-advised smoking cessation  7. Depression and anxiety-improving, advised to follow-up with therapist  8. Alcohol use- asked him to cut down his alcohol use    Health Maintenance:  Immunizations:  Tetanus: given 12/16/21  Influenza:given 12/16/21  Covid:  2 doses of pfizer  We will plan to give PCV 23 since he is a smoker next visit    Screening:  Colon cancer: We will need to discuss colonoscopy once he turns 47  Hepatitis C: Negative  HIV: Negative  Follow-up in 3 months    Orders Placed This Encounter    hydroCHLOROthiazide (HYDRODIURIL) 25 mg tablet     Sig: Take 1 Tablet by mouth daily. Dispense:  90 Tablet     Refill:  2    atorvastatin (LIPITOR) 80 mg tablet     Sig: Take 1 Tablet by mouth daily. Dispense:  90 Tablet     Refill:  2    amLODIPine (NORVASC) 10 mg tablet     Sig: Take 1 Tablet by mouth daily.      Dispense:  90 Tablet     Refill:  2     Social Determinants of Health     Tobacco Use: High Risk    Smoking Tobacco Use: Current Every Day Smoker    Smokeless Tobacco Use: Never Used   Alcohol Use:     Frequency of Alcohol Consumption: Not on file    Average Number of Drinks: Not on file    Frequency of Binge Drinking: Not on file   Financial Resource Strain:     Difficulty of Paying Living Expenses: Not on file   Food Insecurity:     Worried About [address] in the Last Year: Not on file    Ran Out of Food in the Last Year: Not on file   Transportation Needs:     Lack of Transportation (Medical): Not on file    Lack of Transportation (Non-Medical): Not on file   Physical Activity:     Days of Exercise per Week: Not on file    Minutes of Exercise per Session: Not on file   Stress:     Feeling of Stress : Not on file   Social Connections:     Frequency of Communication with Friends and Family: Not on file    Frequency of Social Gatherings with Friends and Family: Not on file    Attends Yarsanism Services: Not on file    Active Member of [address] [place] [address] or Organizations: Not on file    Attends Club or Organization Meetings: Not on file    Marital Status: Not on file   Intimate Partner Violence:     Fear of Current or Ex-Partner: Not on file    Emotionally Abused: Not on file    Physically Abused: Not on file    Sexually Abused: Not on file   Depression: Not at risk    PHQ-2 Score: 0   Housing Stability:     Unable to Pay for Housing in the Last Year: Not on file    Number of [place] in the Last Year: Not on file    Unstable Housing in the Last Year: Not on file       Follow-up and Dispositions    · Return in about 3 months (around 9/20/2022). I have reviewed with the patient details of the assessment and plan and all questions were answered. Relevant patient education was performed. The most recent lab findings were reviewed with the patient. An After Visit Summary was printed and given to the patient. Reason for Visit: Cholesterol Problem, Hypertension, and Pain (Chronic) (foot; )      Subjective:  61-year-old male with history of HTN, HL, prediabetes who comes for follow-up. He denies any new complaints.   Since his last visit he has seen Dr. Katlyn Peterson for his neuropathy and is getting worked up for it. He states his neuropathy improved with the steroids and Neurontin he was given. No other complaints now    Review of Systems  A complete 11 system ROS was preformed (constitutional, eyes, ENT, cardiovascular, respiratory, gastrointestinal, genitourinary, musculoskeletal, skin, neurological, psychiatric) and was negative aside from the pertinent positives and negatives noted in the HPI. Past Medical History:   Diagnosis Date    Anxiety     At risk for sleep apnea 02/21/2022    during phone assessment, stopbang score 5    Chronic obstructive pulmonary disease (HCC)     Depression     Heart murmur     benign    Hypercholesterolemia     Hypertension     Idiopathic peripheral neuropathy     Prediabetes     TB (tuberculosis)     immunized from country of origin, chest xray negative     Past Surgical History:   Procedure Laterality Date    HX HERNIA REPAIR  02/28/2022    LAPAROSCOPIC TOTALLY EXTRA PERITONEAL PREPERITONEAL BILATERAL INGUINAL HERNIA REPAIR WITH MESH     Social History     Socioeconomic History    Marital status:    Tobacco Use    Smoking status: Current Every Day Smoker     Packs/day: 1.00     Years: 30.00     Pack years: 30.00    Smokeless tobacco: Never Used   Substance and Sexual Activity    Alcohol use:  Yes     Alcohol/week: 15.0 standard drinks     Types: 15 Cans of beer per week     Comment: pt reports about 2 beer per day    Drug use: Not Currently    Sexual activity: Yes     Partners: Female     Family History   Problem Relation Age of Onset    Hypertension Mother     Heart Disease Father     Diabetes Brother        No Known Allergies    Objective:  Visit Vitals  /68 (BP 1 Location: Left upper arm, BP Patient Position: Sitting, BP Cuff Size: Adult)   Pulse 88   Temp 97.8 °F (36.6 °C) (Oral)   Resp 16   Ht 6' 2\" (1.88 m)   Wt 213 lb (96.6 kg)   SpO2 99%   BMI 27.35 kg/m²     Physical Exam:   AA&O x3. not in any distress. HEENT: ENT negative. Lungs: clear  Heart: S1 S2 + flow murmur present  Ingrown toenail in the left fifth toe  Psych: Normal affect and mood. Results for orders placed or performed in visit on 05/23/22   CK   Result Value Ref Range     39 - 308 U/L   PROTEIN ELECTROPHORESIS   Result Value Ref Range    Protein, total 7.6 6.0 - 8.5 g/dL    Albumin 4.3 2.9 - 4.4 g/dL    Alpha-1-globulin 0.2 0.0 - 0.4 g/dL    ALPHA-2 GLOBULIN 0.8 0.4 - 1.0 g/dL    Beta globulin 1.0 0.7 - 1.3 g/dL    Gamma globulin 1.3 0.4 - 1.8 g/dL    M-Delfino Not Observed Not Observed g/dL    Globulin, total 3.3 2.2 - 3.9 g/dL    A/G ratio 1.3 0.7 - 1.7     ANGIOTENSIN CONVERTING ENZYME   Result Value Ref Range    Angiotensin Converting Enzyme (ACE) 51 14 - 82 U/L   SADA, DIRECT, W/REFLEX   Result Value Ref Range    SADA, Direct Negative Negative     VITAMIN B12   Result Value Ref Range    Vitamin B12 519 193 - 986 pg/mL   ALDOLASE   Result Value Ref Range    Aldolase 5.9 3.3 - 10.3 U/L   HEAVY METALS PROFILE, UR   Result Value Ref Range    Total volume RANDOM mL    Period of collection RANDOM hr    Creatinine(Crt), urine 2.50 0.30 - 3.00 g/L    Arsenic total, urine 28 (H) 0 - 9 ug/L    Inorg Arsenic/Creat Ratio 11 ug/g creat    Arsenic,Urine 24 Hr 0 0 - 50 ug/24 hr    Lead, urine 2 0 - 49 ug/L    Lead/Creat. Ratio 1 0 - 49 ug/g creat    Lead, urine, 24 Hr 0 0 - 80 ug/24 hr    Mercury, urine 2 0 - 19 ug/L    Mercury/Creat. Ratio 1 0 - 5 ug/g creat    Mercury,urine 24 Hr 0 0 - 20 ug/24 hr   ARSENIC, TOXIC SPECIES   Result Value Ref Range    Inorganic arsenic, urine <10.0 ug/L    Arsenic MMA, urine <5.0 ug/L    Arsenic DMA, urine 6.9 ug/L    Toxic arsenic conc., urine 6.9 ENOC: 35 ug/L    Please note: Mayte Lucero MD, FACP, [facility].   [address], [place], [address] [facility]

## 2022-06-20 NOTE — PROGRESS NOTES
Chief Complaint   Patient presents with    Cholesterol Problem    Hypertension    Pain (Chronic)     foot;      1. Have you been to the ER, urgent care clinic since your last visit? Hospitalized since your last visit? No    2. Have you seen or consulted any other health care providers outside of the [address] [place] [address] since your last visit? Include any pap smears or colon screening.  No

## 2022-06-20 NOTE — LETTER
NOTIFICATION RETURN TO WORK / SCHOOL    6/20/2022 12:30 PM    Mr. Jodeen Pallas  [address] [facility]. [facility] [address]      To Whom It May Concern:    Jodeen Pallas is currently under the care of [address] [place] [address]. He was here for an appointment on 6/20/22    If there are questions or concerns please contact our office.         Sincerely,      Logan Rodrigues MD

## 2022-06-21 RX ORDER — DICLOFENAC SODIUM 100 MG/1
100 TABLET, FILM COATED, EXTENDED RELEASE ORAL DAILY
Qty: 30 TABLET | Refills: 0 | Status: SHIPPED | OUTPATIENT
Start: 2022-06-21 | End: 2022-07-24

## 2022-06-21 RX ORDER — ATORVASTATIN CALCIUM 80 MG/1
80 TABLET, FILM COATED ORAL DAILY
Qty: 90 TABLET | Refills: 1 | Status: SHIPPED | OUTPATIENT
Start: 2022-06-21

## 2022-07-19 ENCOUNTER — OFFICE VISIT (OUTPATIENT)
Dept: NEUROLOGY | Age: 47
End: 2022-07-19
Payer: COMMERCIAL

## 2022-07-19 DIAGNOSIS — G56.21 ULNAR NEUROPATHY OF RIGHT UPPER EXTREMITY: ICD-10-CM

## 2022-07-19 DIAGNOSIS — G62.9 NEUROPATHY: Primary | ICD-10-CM

## 2022-07-19 DIAGNOSIS — G56.02 CARPAL TUNNEL SYNDROME OF LEFT WRIST: ICD-10-CM

## 2022-07-19 DIAGNOSIS — G56.03 BILATERAL CARPAL TUNNEL SYNDROME: ICD-10-CM

## 2022-07-19 PROCEDURE — 95913 NRV CNDJ TEST 13/> STUDIES: CPT | Performed by: PSYCHIATRY & NEUROLOGY

## 2022-07-19 PROCEDURE — 95886 MUSC TEST DONE W/N TEST COMP: CPT | Performed by: PSYCHIATRY & NEUROLOGY

## 2022-07-19 NOTE — PROCEDURES
[address]  [address], [facility] Radha Bates  p: [phone]  f: [phone]    Test Date:  2022    Patient: Catherine Freitas : 1975 Physician: Dr. Caren Barahona   Sex: Male Height:  cm Ref Phys: Dr. Caren Barahona   ID#: [id number] Weight: 213 lbs. Technician: Shiraz Monsalve, ENDOGEN[employer] Tech     Patient Complaints: Numbness and tingling sensation of the hands and legs. Medications: See chart    Patient History / Exam: This is a 53-year-old right-handed black male who is being evaluated for numbness and tingling sensation of the extremities. all ext    NCV & EMG Findings:  Evaluation of the right ulnar motor nerve showed reduced amplitude (Wrist, 7.6 mV). The left median sensory nerve showed reduced amplitude (9.4 µV). All remaining nerves (as indicated in the following tables) were within normal limits. All examined muscles (as indicated in the following table) showed no evidence of electrical instability. Impression: There is electrodiagnostic evidence of axonal neuropathy right ulnar nerve, sensory neuropathy left median  nerve. This is consistent with mild carpal tunnel syndrome left median nerve, and right ulnar neuropathy.   Etiology yet to be determined      Recommendations: Neuropathy work-up suggested      ___________________________  Dr. Caren Barahona        Nerve Conduction Studies  Anti Sensory Summary Table     Stim Site NR Peak (ms) Norm Peak (ms) O-P Amp (µV) Norm O-P Amp Site1 Site2 Delta-0 (ms) Dist (cm) Kei (m/s) Norm Kei (m/s)   Left Median Anti Sensory (2nd Digit)  33°C   Wrist    3.4 <4.0 9.4 >11 Wrist 2nd Digit 2.7 14.0 52    Right Median Anti Sensory (2nd Digit)  33°C   Wrist    3.7 <4.0 25.5 >11 Wrist 2nd Digit 2.5 14.0 56    Elbow    2.1  8.9  Elbow Wrist 0.9 0.0  >48   Left Sup Fibular Anti Sensory (Ant Lat Mall)  33°C   14 cm    2.7 <4.4 17.0 >6 14 cm Ant Lat Mall 2.2 10.0 45    Right Sup Fibular Anti Sensory (Ant Lat Mall)  33°C   14 cm    2.7 <4.4 17.9 >6 14 cm Ant Lat Mall 2.1 10.0 48    Left Sural Anti Sensory (Lat Mall)  33°C   Calf    2.5 <4.4 34.4 >6 Calf Lat Mall 1.7 14.0 82    Right Sural Anti Sensory (Lat Mall)  33°C   Calf    3.0 <4.4 21.5 >6 Calf Lat Mall 2.1 14.0 67    Left Ulnar Anti Sensory (5th Digit)  33°C   Wrist    3.1 <4.0 15.3 >10.0 Wrist 5th Digit 2.3 14.0 61    Right Ulnar Anti Sensory (5th Digit)  33°C   Wrist    3.3 <4.0 11.9 >10.0 Wrist 5th Digit 2.8 14.0 50      Motor Summary Table     Stim Site NR Onset (ms) Norm Onset (ms) O-P Amp (mV) Norm O-P Amp Site1 Site2 Delta-0 (ms) Dist (cm) Kei (m/s) Norm Kei (m/s)   Left Fibular Motor (Ext Dig Brev)  33°C   Ankle    4.9 <6.5 3.9 >1.3 B Fib Ankle 8.3 38.0 46 >38   B Fib    13.2  3.2  Poplt B Fib 1.6 10.0 62 >40   Poplt    14.8  3.1          Right Fibular Motor (Ext Dig Brev)  33°C   Ankle    4.1 <6.5 4.2 >1.3 B Fib Ankle 8.2 38.0 46 >38   B Fib    12.3  3.9  Poplt B Fib 2.2 10.0 45 >40   Poplt    14.5  4.0          Left Median Motor (Abd Poll Brev)  33°C   Wrist    4.0 <4.5 5.7 >4.1 Wrist Abd Poll Brev 4.0 8.0 20    Elbow    9.1  3.7  Elbow Wrist 5.1 26.0 51 >49   Right Median Motor (Abd Poll Brev)  33°C   Wrist    4.5 <4.5 5.1 >4.1 Wrist Abd Poll Brev 4.5 8.0 18    Elbow    9.3  4.8  Elbow Wrist 4.8 27.0 56 >49   Left Tibial Motor (Abd Alarcon Brev)  33°C   Ankle    3.7 <6.1 8.1 >4.4 Knee Ankle 10.4 42.0 40 >39   Knee    14.1  5.8          Right Tibial Motor (Abd Alarcon Brev)  33°C   Ankle    5.9 <6.1 14.4 >4.4 Knee Ankle 8.5 43.0 51 >39   Knee    14.4  11.9          Left Ulnar Motor (Abd Dig Min)  33°C   Wrist    3.3 <3.7 10.5 >7.9 Wrist Abd Dig Min 3.3 8.0 24    B Elbow    7.4  9.7 >6.0 B Elbow Wrist 4.1 26.0 63 >52   A Elbow    9.5  9.3 >6.0 A Elbow B Elbow 2.1 10.0 48 >43   Right Ulnar Motor (Abd Dig Min)  33°C   Wrist    3.4 <3.7 7.6 >7.9 Wrist Abd Dig Min 3.4 8.0 24    B Elbow    7.7  7.4 >6.0 B Elbow Wrist 4.3 26.0 60 >52   A Elbow    9.7 6. 8 >6.0 A Elbow B Elbow 2.0 10.0 50 >43     EMG+     Side Muscle Nerve Root Ins Act Fibs Psw Amp Dur Poly Recrt Int Shirley Nam Comment   Right Abd Dig Min Ulnar C8-T1 Nml Nml Nml Nml Nml 0 Nml Nml    Right VastusMed Femoral L2-4 Nml Nml Nml Nml Nml 0 Nml Nml    Right VastusLat Femoral L2-4 Nml Nml Nml Nml Nml 0 Nml Nml    Right QuadratusFem QuadFemoris L4-5, S1 Nml Nml Nml Nml Nml 0 Nml Nml    Right AntTibialis Dp Br Fibular L4-5 Nml Nml Nml Nml Nml 0 Nml Nml    Right Fibularis Long Sup Br Fibular L5-S1 Nml Nml Nml Nml Nml 0 Nml Nml    Right Deltoid Axillary C5-6 Nml Nml Nml Nml Nml 0 Nml Nml    Right Biceps Musculocut C5-6 Nml Nml Nml Nml Nml 0 Nml Nml    Right Triceps Radial C6-7-8 Nml Nml Nml Nml Nml 0 Nml Nml    Right BrachioRad Radial C5-6 Nml Nml Nml Nml Nml 0 Nml Nml        Nerve Conduction Studies  Anti Sensory Left/Right Comparison     Stim Site L Lat (ms) R Lat (ms) L-R Lat (ms) L Amp (µV) R Amp (µV) L-R Amp (%) Site1 Site2 L Kei (m/s) R Kei (m/s) L-R Kei (m/s)   Median Anti Sensory (2nd Digit)  33°C   Wrist 3.4 3.7 0.3 9.4 25.5 63.1 Wrist 2nd Digit 52 56 4   Sup Fibular Anti Sensory (Ant Lat Mall)  33°C   14 cm 2.7 2.7 0.0 17.0 17.9 5.0 14 cm Ant Lat Mall 45 48 3   Sural Anti Sensory (Lat Mall)  33°C   Calf 2.5 3.0 0.5 34.4 21.5 37.5 Calf Lat Mall 82 67 15   Ulnar Anti Sensory (5th Digit)  33°C   Wrist 3.1 3.3 0.2 15.3 11.9 22.2 Wrist 5th Digit 61 50 11     Motor Left/Right Comparison     Stim Site L Lat (ms) R Lat (ms) L-R Lat (ms) L Amp (mV) R Amp (mV) L-R Amp (%) Site1 Site2 L Kei (m/s) R Kei (m/s) L-R Kei (m/s)   Fibular Motor (Ext Dig Brev)  33°C   Ankle 4.9 4.1 0.8 3.9 4.2 7.1 B Fib Ankle 46 46 0   B Fib 13.2 12.3 0.9 3.2 3.9 17.9 Poplt B Fib 62 45 17   Poplt 14.8 14.5 0.3 3.1 4.0 22.5        Median Motor (Abd Poll Brev)  33°C   Wrist 4.0 4.5 0.5 5.7 5.1 10.5 Wrist Abd Poll Brev 20 18 2   Elbow 9.1 9.3 0.2 3.7 4.8 22.9 Elbow Wrist 51 56 5   Tibial Motor (Abd Alarcon Brev)  33°C   Ankle 3.7 5.9 2.2 8.1 14.4 43.8 Knee Ankle 40 51 11   Knee 14.1 14.4 0.3 5.8 11.9 51.3        Ulnar Motor (Abd Dig Min)  33°C   Wrist 3.3 3.4 0.1 10.5 7.6 27.6 Wrist Abd Dig Min 24 24 0   B Elbow 7.4 7.7 0.3 9.7 7.4 23.7 B Elbow Wrist 63 60 3   A Elbow 9.5 9.7 0.2 9.3 6.8 26.9 A Elbow B Elbow 48 50 2         Waveforms:

## 2022-07-24 RX ORDER — DICLOFENAC SODIUM 100 MG/1
100 TABLET, FILM COATED, EXTENDED RELEASE ORAL DAILY
Qty: 30 TABLET | Refills: 0 | Status: SHIPPED | OUTPATIENT
Start: 2022-07-24

## (undated) DEVICE — TROCAR ENDOSCP L100MM DIA10MM STRUCTURAL BLLN FOR LAP

## (undated) DEVICE — DEVICE FIX 5MM TI FOR LAP HERN REP PROTACK

## (undated) DEVICE — SUTURE VCRL SZ 4-0 L27IN ABSRB UD L19MM PS-2 3/8 CIR PRIM J426H

## (undated) DEVICE — HYPODERMIC SAFETY NEEDLE: Brand: MONOJECT

## (undated) DEVICE — KENDALL DL ECG CABLE AND LEAD WIRE SYSTEM, 3-LEAD, SINGLE PATIENT USE: Brand: KENDALL

## (undated) DEVICE — SUTURE SZ 0 27IN 5/8 CIR UR-6  TAPER PT VIOLET ABSRB VICRYL J603H

## (undated) DEVICE — BLADE ASSEMB CLP HAIR FINE --

## (undated) DEVICE — TROCAR: Brand: KII FIOS FIRST ENTRY

## (undated) DEVICE — TROCAR: Brand: KII® SLEEVE

## (undated) DEVICE — DISSECTOR ENDOSCP PREPERI KID SHP DISTENSION BLLN SPCMKR

## (undated) DEVICE — GLOVE ORANGE PI 7 1/2   MSG9075

## (undated) DEVICE — COVER LT HNDL PLAS RIG 1 PER PK

## (undated) DEVICE — GOWN,SIRUS,NONRNF,SETINSLV,2XL,18/CS: Brand: MEDLINE

## (undated) DEVICE — GENERAL LAPAROSCOPY-MRMC: Brand: MEDLINE INDUSTRIES, INC.